# Patient Record
Sex: MALE | Race: WHITE | NOT HISPANIC OR LATINO | Employment: OTHER | URBAN - METROPOLITAN AREA
[De-identification: names, ages, dates, MRNs, and addresses within clinical notes are randomized per-mention and may not be internally consistent; named-entity substitution may affect disease eponyms.]

---

## 2017-01-19 ENCOUNTER — AMBULATORY - HEALTHEAST (OUTPATIENT)
Dept: CARDIOLOGY | Facility: CLINIC | Age: 82
End: 2017-01-19

## 2017-01-20 ENCOUNTER — COMMUNICATION - HEALTHEAST (OUTPATIENT)
Dept: CARDIOLOGY | Facility: CLINIC | Age: 82
End: 2017-01-20

## 2017-02-21 ENCOUNTER — AMBULATORY - HEALTHEAST (OUTPATIENT)
Dept: CARDIOLOGY | Facility: CLINIC | Age: 82
End: 2017-02-21

## 2017-02-21 ENCOUNTER — OFFICE VISIT - HEALTHEAST (OUTPATIENT)
Dept: CARDIOLOGY | Facility: CLINIC | Age: 82
End: 2017-02-21

## 2017-02-21 DIAGNOSIS — I50.31 ACUTE DIASTOLIC HEART FAILURE (H): ICD-10-CM

## 2017-02-21 ASSESSMENT — MIFFLIN-ST. JEOR: SCORE: 1830.14

## 2017-02-22 ENCOUNTER — COMMUNICATION - HEALTHEAST (OUTPATIENT)
Dept: CARDIOLOGY | Facility: CLINIC | Age: 82
End: 2017-02-22

## 2017-02-22 ENCOUNTER — AMBULATORY - HEALTHEAST (OUTPATIENT)
Dept: CARDIOLOGY | Facility: CLINIC | Age: 82
End: 2017-02-22

## 2017-02-22 DIAGNOSIS — Z95.0 PACEMAKER: ICD-10-CM

## 2017-03-08 ENCOUNTER — HOSPITAL ENCOUNTER (OUTPATIENT)
Dept: CARDIOLOGY | Facility: CLINIC | Age: 82
Discharge: HOME | End: 2017-03-08
Attending: INTERNAL MEDICINE

## 2017-03-08 ASSESSMENT — MIFFLIN-ST. JEOR: SCORE: 1830.14

## 2017-03-09 LAB
AORTIC ROOT: 3.5 CM
BSA FOR ECHO PROCEDURE: 2.42 M2
CV BLOOD PRESSURE: NORMAL MMHG
CV ECHO HEIGHT: 72 IN
CV ECHO WEIGHT: 254 LBS
DOP CALC LVOT AREA: 4.15 CM2
DOP CALC LVOT DIAMETER: 2.3 CM
DOP CALC LVOT PEAK VEL: 75.9 CM/S
DOP CALC LVOT STROKE VOLUME: 56.9 CM3
DOP CALCLVOT PEAK VEL VTI: 13.7 CM
ECHO EJECTION FRACTION ESTIMATED: 55 %
EJECTION FRACTION: 52 % (ref 55–75)
FRACTIONAL SHORTENING: 26.8 % (ref 28–44)
INTERVENTRICULAR SEPTUM IN END DIASTOLE: 0.94 CM (ref 0.6–1)
IVS/PW RATIO: 0.9
LA AREA 1: 23.3 CM2
LA AREA 2: 21.6 CM2
LEFT ATRIUM LENGTH: 5.66 CM
LEFT ATRIUM SIZE: 4.9 CM
LEFT ATRIUM TO AORTIC ROOT RATIO: 1.4 NO UNITS
LEFT ATRIUM VOLUME INDEX: 31.2 ML/M2
LEFT ATRIUM VOLUME: 75.6 CM3
LEFT VENTRICLE DIASTOLIC VOLUME INDEX: 38 CM3/M2 (ref 34–74)
LEFT VENTRICLE DIASTOLIC VOLUME: 92 CM3 (ref 62–150)
LEFT VENTRICLE MASS INDEX: 66.9 G/M2
LEFT VENTRICLE SYSTOLIC VOLUME INDEX: 18.2 CM3/M2 (ref 11–31)
LEFT VENTRICLE SYSTOLIC VOLUME: 44 CM3 (ref 21–61)
LEFT VENTRICULAR INTERNAL DIMENSION IN DIASTOLE: 4.71 CM (ref 4.2–5.8)
LEFT VENTRICULAR INTERNAL DIMENSION IN SYSTOLE: 3.45 CM (ref 2.5–4)
LEFT VENTRICULAR MASS: 161.8 G
LEFT VENTRICULAR OUTFLOW TRACT MEAN GRADIENT: 1 MMHG
LEFT VENTRICULAR OUTFLOW TRACT MEAN VELOCITY: 56.2 CM/S
LEFT VENTRICULAR OUTFLOW TRACT PEAK GRADIENT: 2 MMHG
LEFT VENTRICULAR POSTERIOR WALL IN END DIASTOLE: 1.03 CM (ref 0.6–1)
LV STROKE VOLUME INDEX: 23.5 ML/M2
MV AVERAGE E/E' RATIO: 6.6 CM/S
MV DECELERATION TIME: 204 MS
MV E'TISSUE VEL-LAT: 14.8 CM/S
MV E'TISSUE VEL-MED: 13.3 CM/S
MV LATERAL E/E' RATIO: 6.3
MV MEDIAL E/E' RATIO: 7
MV PEAK E VELOCITY: 92.8 CM/S
NUC REST DIASTOLIC VOLUME INDEX: 4064 LBS
NUC REST SYSTOLIC VOLUME INDEX: 72 IN
TRICUSPID REGURGITATION PEAK PRESSURE GRADIENT: 59 MMHG
TRICUSPID VALVE ANULAR PLANE SYSTOLIC EXCURSION: 1.6 CM
TRICUSPID VALVE PEAK REGURGITANT VELOCITY: 384 CM/S

## 2017-05-25 ENCOUNTER — AMBULATORY - HEALTHEAST (OUTPATIENT)
Dept: CARDIOLOGY | Facility: CLINIC | Age: 82
End: 2017-05-25

## 2017-05-31 ENCOUNTER — OFFICE VISIT - HEALTHEAST (OUTPATIENT)
Dept: CARDIOLOGY | Facility: CLINIC | Age: 82
End: 2017-05-31

## 2017-05-31 ENCOUNTER — AMBULATORY - HEALTHEAST (OUTPATIENT)
Dept: CARDIOLOGY | Facility: CLINIC | Age: 82
End: 2017-05-31

## 2017-05-31 DIAGNOSIS — Z95.0 PRESENCE OF PERMANENT CARDIAC PACEMAKER: ICD-10-CM

## 2017-05-31 DIAGNOSIS — Z95.0 PACEMAKER: ICD-10-CM

## 2017-05-31 DIAGNOSIS — I50.32 CHRONIC DIASTOLIC HEART FAILURE (H): ICD-10-CM

## 2017-05-31 DIAGNOSIS — I48.91 ATRIAL FIBRILLATION (H): ICD-10-CM

## 2017-05-31 LAB — HCC DEVICE COMMENTS: NORMAL

## 2017-05-31 ASSESSMENT — MIFFLIN-ST. JEOR: SCORE: 1830.14

## 2017-06-06 ENCOUNTER — HOSPITAL ENCOUNTER (OUTPATIENT)
Dept: CARDIOLOGY | Facility: CLINIC | Age: 82
Discharge: HOME | End: 2017-06-06
Attending: INTERNAL MEDICINE

## 2017-06-06 DIAGNOSIS — I48.91 ATRIAL FIBRILLATION (H): ICD-10-CM

## 2017-06-15 ENCOUNTER — COMMUNICATION - HEALTHEAST (OUTPATIENT)
Dept: CARDIOLOGY | Facility: CLINIC | Age: 82
End: 2017-06-15

## 2017-06-15 DIAGNOSIS — I48.0 PAROXYSMAL ATRIAL FIBRILLATION (H): ICD-10-CM

## 2017-07-06 ENCOUNTER — COMMUNICATION - HEALTHEAST (OUTPATIENT)
Dept: CARDIOLOGY | Facility: CLINIC | Age: 82
End: 2017-07-06

## 2017-07-06 DIAGNOSIS — R07.9 CHEST PAIN: ICD-10-CM

## 2017-08-30 ENCOUNTER — AMBULATORY - HEALTHEAST (OUTPATIENT)
Dept: CARDIOLOGY | Facility: CLINIC | Age: 82
End: 2017-08-30

## 2017-08-30 DIAGNOSIS — Z95.0 PACEMAKER: ICD-10-CM

## 2017-08-30 LAB — HCC DEVICE COMMENTS: NORMAL

## 2017-09-28 ENCOUNTER — COMMUNICATION - HEALTHEAST (OUTPATIENT)
Dept: CARDIOLOGY | Facility: CLINIC | Age: 82
End: 2017-09-28

## 2017-09-28 DIAGNOSIS — R07.9 CHEST PAIN: ICD-10-CM

## 2017-12-04 ENCOUNTER — RECORDS - HEALTHEAST (OUTPATIENT)
Dept: LAB | Facility: CLINIC | Age: 82
End: 2017-12-04

## 2017-12-04 LAB
CHOLEST SERPL-MCNC: 120 MG/DL
FASTING STATUS PATIENT QL REPORTED: NO
HDLC SERPL-MCNC: 39 MG/DL
LDLC SERPL CALC-MCNC: 61 MG/DL
TRIGL SERPL-MCNC: 101 MG/DL

## 2017-12-06 ENCOUNTER — AMBULATORY - HEALTHEAST (OUTPATIENT)
Dept: CARDIOLOGY | Facility: CLINIC | Age: 82
End: 2017-12-06

## 2017-12-06 DIAGNOSIS — Z95.0 PACEMAKER: ICD-10-CM

## 2017-12-06 LAB — HCC DEVICE COMMENTS: NORMAL

## 2017-12-14 ENCOUNTER — COMMUNICATION - HEALTHEAST (OUTPATIENT)
Dept: CARDIOLOGY | Facility: CLINIC | Age: 82
End: 2017-12-14

## 2017-12-14 DIAGNOSIS — I48.0 PAROXYSMAL ATRIAL FIBRILLATION (H): ICD-10-CM

## 2018-01-24 ENCOUNTER — COMMUNICATION - HEALTHEAST (OUTPATIENT)
Dept: ADMINISTRATIVE | Facility: CLINIC | Age: 83
End: 2018-01-24

## 2018-03-08 ENCOUNTER — COMMUNICATION - HEALTHEAST (OUTPATIENT)
Dept: CARDIOLOGY | Facility: CLINIC | Age: 83
End: 2018-03-08

## 2018-03-08 DIAGNOSIS — I48.0 PAROXYSMAL ATRIAL FIBRILLATION (H): ICD-10-CM

## 2018-03-14 ENCOUNTER — AMBULATORY - HEALTHEAST (OUTPATIENT)
Dept: CARDIOLOGY | Facility: CLINIC | Age: 83
End: 2018-03-14

## 2018-03-14 DIAGNOSIS — Z95.0 PACEMAKER: ICD-10-CM

## 2018-03-14 LAB — HCC DEVICE COMMENTS: NORMAL

## 2018-04-05 ENCOUNTER — HOME CARE/HOSPICE - HEALTHEAST (OUTPATIENT)
Dept: HOME HEALTH SERVICES | Facility: HOME HEALTH | Age: 83
End: 2018-04-05

## 2018-04-08 ENCOUNTER — HOME CARE/HOSPICE - HEALTHEAST (OUTPATIENT)
Dept: HOME HEALTH SERVICES | Facility: HOME HEALTH | Age: 83
End: 2018-04-08

## 2018-04-11 ENCOUNTER — HOME CARE/HOSPICE - HEALTHEAST (OUTPATIENT)
Dept: HOME HEALTH SERVICES | Facility: HOME HEALTH | Age: 83
End: 2018-04-11

## 2018-04-13 ENCOUNTER — HOME CARE/HOSPICE - HEALTHEAST (OUTPATIENT)
Dept: HOME HEALTH SERVICES | Facility: HOME HEALTH | Age: 83
End: 2018-04-13

## 2018-04-18 ENCOUNTER — OFFICE VISIT - HEALTHEAST (OUTPATIENT)
Dept: CARDIOLOGY | Facility: CLINIC | Age: 83
End: 2018-04-18

## 2018-04-18 DIAGNOSIS — I50.9 CHF (CONGESTIVE HEART FAILURE) (H): ICD-10-CM

## 2018-04-18 DIAGNOSIS — I48.91 A-FIB (H): ICD-10-CM

## 2018-04-18 LAB
ANION GAP SERPL CALCULATED.3IONS-SCNC: 11 MMOL/L (ref 5–18)
ATRIAL RATE - MUSE: 78 BPM
BNP SERPL-MCNC: 579 PG/ML (ref 0–93)
BUN SERPL-MCNC: 19 MG/DL (ref 8–28)
CALCIUM SERPL-MCNC: 9.1 MG/DL (ref 8.5–10.5)
CHLORIDE BLD-SCNC: 94 MMOL/L (ref 98–107)
CO2 SERPL-SCNC: 34 MMOL/L (ref 22–31)
CREAT SERPL-MCNC: 0.86 MG/DL (ref 0.7–1.3)
DIASTOLIC BLOOD PRESSURE - MUSE: NORMAL MMHG
GFR SERPL CREATININE-BSD FRML MDRD: >60 ML/MIN/1.73M2
GLUCOSE BLD-MCNC: 159 MG/DL (ref 70–125)
INTERPRETATION ECG - MUSE: NORMAL
MAGNESIUM SERPL-MCNC: 1.6 MG/DL (ref 1.8–2.6)
P AXIS - MUSE: NORMAL DEGREES
POTASSIUM BLD-SCNC: 3.9 MMOL/L (ref 3.5–5)
PR INTERVAL - MUSE: NORMAL MS
QRS DURATION - MUSE: 88 MS
QT - MUSE: 390 MS
QTC - MUSE: 429 MS
R AXIS - MUSE: 99 DEGREES
SODIUM SERPL-SCNC: 139 MMOL/L (ref 136–145)
SYSTOLIC BLOOD PRESSURE - MUSE: NORMAL MMHG
T AXIS - MUSE: -40 DEGREES
VENTRICULAR RATE- MUSE: 73 BPM

## 2018-04-18 ASSESSMENT — MIFFLIN-ST. JEOR: SCORE: 1823.34

## 2018-04-19 ENCOUNTER — HOME CARE/HOSPICE - HEALTHEAST (OUTPATIENT)
Dept: HOME HEALTH SERVICES | Facility: HOME HEALTH | Age: 83
End: 2018-04-19

## 2018-04-20 ENCOUNTER — HOME CARE/HOSPICE - HEALTHEAST (OUTPATIENT)
Dept: HOME HEALTH SERVICES | Facility: HOME HEALTH | Age: 83
End: 2018-04-20

## 2018-04-24 ENCOUNTER — HOME CARE/HOSPICE - HEALTHEAST (OUTPATIENT)
Dept: HOME HEALTH SERVICES | Facility: HOME HEALTH | Age: 83
End: 2018-04-24

## 2018-04-24 LAB — POC INR - HE - HISTORICAL: 2.3 (ref 0.9–1.1)

## 2018-04-25 ENCOUNTER — HOSPITAL ENCOUNTER (OUTPATIENT)
Dept: CARDIOLOGY | Facility: CLINIC | Age: 83
Discharge: HOME | End: 2018-04-25
Attending: INTERNAL MEDICINE

## 2018-04-25 DIAGNOSIS — I50.9 CHF (CONGESTIVE HEART FAILURE) (H): ICD-10-CM

## 2018-04-25 LAB
AORTIC ROOT: 3.7 CM
AORTIC VALVE MEAN VELOCITY: 86.9 CM/S
ASCENDING AORTA: 3.6 CM
AV CUSP SEPERATION: 2.1 CM
AV CUSP SEPERATION: 2.1 CM
AV DIMENSIONLESS INDEX VTI: 0.7
AV MEAN GRADIENT: 3 MMHG
AV PEAK GRADIENT: 5.8 MMHG
AV VALVE AREA: 2.6 CM2
AV VELOCITY RATIO: 0.6
BSA FOR ECHO PROCEDURE: 2.41 M2
CV ECHO HEIGHT: 73 IN
CV ECHO WEIGHT: 249 LBS
DOP CALC AO PEAK VEL: 120 CM/S
DOP CALC AO VTI: 17.6 CM
DOP CALC LVOT AREA: 3.8 CM2
DOP CALC LVOT DIAMETER: 2.2 CM
DOP CALC LVOT PEAK VEL: 70.2 CM/S
DOP CALC LVOT STROKE VOLUME: 46.4 CM3
DOP CALC MV VTI: 11.6 CM
DOP CALCLVOT PEAK VEL VTI: 12.2 CM
EJECTION FRACTION: 56
EJECTION FRACTION: 56 % (ref 55–75)
FRACTIONAL SHORTENING: 26.2 % (ref 28–44)
INTERVENTRICULAR SEPTUM IN END DIASTOLE: 1.09 CM (ref 0.6–1)
IVS/PW RATIO: 1
LA AREA 1: 20.6 CM2
LA AREA 2: 19.3 CM2
LEFT ATRIUM LENGTH: 5.5 CM
LEFT ATRIUM SIZE: 4.9 CM
LEFT ATRIUM VOLUME INDEX: 25.5 ML/M2
LEFT ATRIUM VOLUME: 61.4 ML
LEFT VENTRICLE CARDIAC INDEX: 1.7 L/MIN/M2
LEFT VENTRICLE CARDIAC OUTPUT: 4.2 L/MIN
LEFT VENTRICLE DIASTOLIC VOLUME INDEX: 22.1 CM3/M2 (ref 34–74)
LEFT VENTRICLE DIASTOLIC VOLUME: 53.3 CM3 (ref 62–150)
LEFT VENTRICLE HEART RATE: 90 BPM
LEFT VENTRICLE MASS INDEX: 79.7 G/M2
LEFT VENTRICLE SYSTOLIC VOLUME INDEX: 9.8 CM3/M2 (ref 11–31)
LEFT VENTRICLE SYSTOLIC VOLUME: 23.5 CM3 (ref 21–61)
LEFT VENTRICULAR INTERNAL DIMENSION IN DIASTOLE: 4.81 CM (ref 4.2–5.8)
LEFT VENTRICULAR INTERNAL DIMENSION IN SYSTOLE: 3.55 CM (ref 2.5–4)
LEFT VENTRICULAR MASS: 192.2 G
LEFT VENTRICULAR OUTFLOW TRACT MEAN GRADIENT: 1 MMHG
LEFT VENTRICULAR OUTFLOW TRACT MEAN VELOCITY: 49 CM/S
LEFT VENTRICULAR OUTFLOW TRACT PEAK GRADIENT: 2 MMHG
LEFT VENTRICULAR POSTERIOR WALL IN END DIASTOLE: 1.09 CM (ref 0.6–1)
LV STROKE VOLUME INDEX: 19.2 ML/M2
MITRAL VALVE DECELERATION SLOPE: 5980 MM/S2
MITRAL VALVE MEAN INFLOW VELOCITY: 39.9 CM/S
MITRAL VALVE PEAK VELOCITY: 93.7 CM/S
MITRAL VALVE PRESSURE HALF-TIME: 50 MS
MV AREA VTI: 4 CM2
MV AVERAGE E/E' RATIO: 7.9 CM/S
MV DECELERATION TIME: 173 MS
MV E'TISSUE VEL-LAT: 12.1 CM/S
MV E'TISSUE VEL-MED: 6.58 CM/S
MV LATERAL E/E' RATIO: 6.1
MV MEAN GRADIENT: 1 MMHG
MV MEDIAL E/E' RATIO: 11.2
MV PEAK E VELOCITY: 73.7 CM/S
MV PEAK GRADIENT: 3.5 MMHG
MV VALVE AREA BY CONTINUITY EQUATION: 4 CM2
MV VALVE AREA PRESSURE 1/2 METHOD: 4.4 CM2
NUC REST DIASTOLIC VOLUME INDEX: 3984 LBS
NUC REST SYSTOLIC VOLUME INDEX: 73 IN
RAA: 35.1 CM2
RIGHT VENTRICULAR INTERNAL DIMENSION IN DYSTOLE: 3.43 CM
TRICUSPID REGURGITATION PEAK PRESSURE GRADIENT: 71.2 MMHG
TRICUSPID VALVE ANULAR PLANE SYSTOLIC EXCURSION: 1.1 CM
TRICUSPID VALVE PEAK REGURGITANT VELOCITY: 422 CM/S

## 2018-04-25 ASSESSMENT — MIFFLIN-ST. JEOR: SCORE: 1823.34

## 2018-04-26 ENCOUNTER — HOME CARE/HOSPICE - HEALTHEAST (OUTPATIENT)
Dept: HOME HEALTH SERVICES | Facility: HOME HEALTH | Age: 83
End: 2018-04-26

## 2018-04-27 ENCOUNTER — AMBULATORY - HEALTHEAST (OUTPATIENT)
Dept: CARDIOLOGY | Facility: CLINIC | Age: 83
End: 2018-04-27

## 2018-04-27 DIAGNOSIS — I27.20 PULMONARY HYPERTENSION, MODERATE TO SEVERE (H): ICD-10-CM

## 2018-04-27 DIAGNOSIS — I10 ESSENTIAL HYPERTENSION: ICD-10-CM

## 2018-04-27 DIAGNOSIS — Z95.0 PRESENCE OF PERMANENT CARDIAC PACEMAKER: ICD-10-CM

## 2018-04-27 DIAGNOSIS — I48.19 PERSISTENT ATRIAL FIBRILLATION (H): ICD-10-CM

## 2018-04-27 ASSESSMENT — MIFFLIN-ST. JEOR: SCORE: 1792.94

## 2018-04-30 ENCOUNTER — COMMUNICATION - HEALTHEAST (OUTPATIENT)
Dept: CARDIOLOGY | Facility: CLINIC | Age: 83
End: 2018-04-30

## 2018-04-30 DIAGNOSIS — I48.19 PERSISTENT ATRIAL FIBRILLATION (H): ICD-10-CM

## 2018-04-30 LAB
HCC DEVICE COMMENTS: NORMAL
HCC DEVICE IMPLANTING PROVIDER: NORMAL
HCC DEVICE MANUFACTURE: NORMAL
HCC DEVICE MODEL: NORMAL
HCC DEVICE SERIAL NUMBER: NORMAL
HCC DEVICE TYPE: NORMAL

## 2018-05-01 ENCOUNTER — HOME CARE/HOSPICE - HEALTHEAST (OUTPATIENT)
Dept: HOME HEALTH SERVICES | Facility: HOME HEALTH | Age: 83
End: 2018-05-01

## 2018-05-02 ENCOUNTER — COMMUNICATION - HEALTHEAST (OUTPATIENT)
Dept: CARDIOLOGY | Facility: CLINIC | Age: 83
End: 2018-05-02

## 2018-05-02 ENCOUNTER — AMBULATORY - HEALTHEAST (OUTPATIENT)
Dept: CARDIOLOGY | Facility: CLINIC | Age: 83
End: 2018-05-02

## 2018-05-02 DIAGNOSIS — I48.19 PERSISTENT ATRIAL FIBRILLATION (H): ICD-10-CM

## 2018-05-02 LAB
ATRIAL RATE - MUSE: 79 BPM
DIASTOLIC BLOOD PRESSURE - MUSE: NORMAL MMHG
INTERPRETATION ECG - MUSE: NORMAL
P AXIS - MUSE: 35 DEGREES
PR INTERVAL - MUSE: 176 MS
QRS DURATION - MUSE: 90 MS
QT - MUSE: 398 MS
QTC - MUSE: 456 MS
R AXIS - MUSE: 89 DEGREES
SYSTOLIC BLOOD PRESSURE - MUSE: NORMAL MMHG
T AXIS - MUSE: -16 DEGREES
VENTRICULAR RATE- MUSE: 79 BPM

## 2018-05-02 ASSESSMENT — MIFFLIN-ST. JEOR: SCORE: 1775.7

## 2018-05-04 ENCOUNTER — COMMUNICATION - HEALTHEAST (OUTPATIENT)
Dept: CARDIOLOGY | Facility: CLINIC | Age: 83
End: 2018-05-04

## 2018-05-07 ENCOUNTER — COMMUNICATION - HEALTHEAST (OUTPATIENT)
Dept: CARDIOLOGY | Facility: CLINIC | Age: 83
End: 2018-05-07

## 2018-05-09 ENCOUNTER — OFFICE VISIT - HEALTHEAST (OUTPATIENT)
Dept: CARDIOLOGY | Facility: CLINIC | Age: 83
End: 2018-05-09

## 2018-05-09 DIAGNOSIS — I48.19 PERSISTENT ATRIAL FIBRILLATION (H): ICD-10-CM

## 2018-05-09 DIAGNOSIS — I50.32 CHRONIC DIASTOLIC CHF (CONGESTIVE HEART FAILURE), NYHA CLASS 3 (H): ICD-10-CM

## 2018-05-09 DIAGNOSIS — I27.20 PULMONARY HYPERTENSION, MODERATE TO SEVERE (H): ICD-10-CM

## 2018-05-09 DIAGNOSIS — J96.11 CHRONIC RESPIRATORY FAILURE WITH HYPOXIA (H): ICD-10-CM

## 2018-05-09 DIAGNOSIS — I10 ESSENTIAL HYPERTENSION: ICD-10-CM

## 2018-05-09 ASSESSMENT — MIFFLIN-ST. JEOR: SCORE: 1789.31

## 2018-05-17 ENCOUNTER — COMMUNICATION - HEALTHEAST (OUTPATIENT)
Dept: CARDIOLOGY | Facility: CLINIC | Age: 83
End: 2018-05-17

## 2018-05-18 ENCOUNTER — COMMUNICATION - HEALTHEAST (OUTPATIENT)
Dept: CARDIOLOGY | Facility: CLINIC | Age: 83
End: 2018-05-18

## 2018-05-21 ENCOUNTER — RECORDS - HEALTHEAST (OUTPATIENT)
Dept: LAB | Facility: CLINIC | Age: 83
End: 2018-05-21

## 2018-05-21 ENCOUNTER — COMMUNICATION - HEALTHEAST (OUTPATIENT)
Dept: CARDIOLOGY | Facility: CLINIC | Age: 83
End: 2018-05-21

## 2018-05-21 ENCOUNTER — AMBULATORY - HEALTHEAST (OUTPATIENT)
Dept: CARDIOLOGY | Facility: CLINIC | Age: 83
End: 2018-05-21

## 2018-05-21 DIAGNOSIS — I50.32 CHRONIC DIASTOLIC HEART FAILURE, NYHA CLASS 3 (H): ICD-10-CM

## 2018-05-21 LAB
ANION GAP SERPL CALCULATED.3IONS-SCNC: 10 MMOL/L (ref 5–18)
BUN SERPL-MCNC: 15 MG/DL (ref 8–28)
CALCIUM SERPL-MCNC: 9.1 MG/DL (ref 8.5–10.5)
CHLORIDE BLD-SCNC: 97 MMOL/L (ref 98–107)
CO2 SERPL-SCNC: 33 MMOL/L (ref 22–31)
CREAT SERPL-MCNC: 0.9 MG/DL (ref 0.7–1.3)
GFR SERPL CREATININE-BSD FRML MDRD: >60 ML/MIN/1.73M2
GLUCOSE BLD-MCNC: 130 MG/DL (ref 70–125)
POTASSIUM BLD-SCNC: 3.9 MMOL/L (ref 3.5–5)
SODIUM SERPL-SCNC: 140 MMOL/L (ref 136–145)

## 2018-05-24 ENCOUNTER — COMMUNICATION - HEALTHEAST (OUTPATIENT)
Dept: INTENSIVE CARE | Facility: CLINIC | Age: 83
End: 2018-05-24

## 2018-05-24 DIAGNOSIS — I50.31 DIASTOLIC CHF, ACUTE (H): ICD-10-CM

## 2018-05-31 ENCOUNTER — AMBULATORY - HEALTHEAST (OUTPATIENT)
Dept: CARDIOLOGY | Facility: CLINIC | Age: 83
End: 2018-05-31

## 2018-06-15 ENCOUNTER — AMBULATORY - HEALTHEAST (OUTPATIENT)
Dept: CARDIOLOGY | Facility: CLINIC | Age: 83
End: 2018-06-15

## 2018-06-15 ENCOUNTER — RECORDS - HEALTHEAST (OUTPATIENT)
Dept: LAB | Facility: CLINIC | Age: 83
End: 2018-06-15

## 2018-06-15 ENCOUNTER — RECORDS - HEALTHEAST (OUTPATIENT)
Dept: ADMINISTRATIVE | Facility: OTHER | Age: 83
End: 2018-06-15

## 2018-06-15 LAB
ALBUMIN SERPL-MCNC: 3.6 G/DL (ref 3.5–5)
ALP SERPL-CCNC: 52 U/L (ref 45–120)
ALT SERPL W P-5'-P-CCNC: 11 U/L (ref 0–45)
ANION GAP SERPL CALCULATED.3IONS-SCNC: 10 MMOL/L (ref 5–18)
AST SERPL W P-5'-P-CCNC: 14 U/L (ref 0–40)
BILIRUB SERPL-MCNC: 0.7 MG/DL (ref 0–1)
BUN SERPL-MCNC: 18 MG/DL (ref 8–28)
CALCIUM SERPL-MCNC: 9.3 MG/DL (ref 8.5–10.5)
CHLORIDE BLD-SCNC: 99 MMOL/L (ref 98–107)
CO2 SERPL-SCNC: 33 MMOL/L (ref 22–31)
CREAT SERPL-MCNC: 0.99 MG/DL (ref 0.7–1.3)
GFR SERPL CREATININE-BSD FRML MDRD: >60 ML/MIN/1.73M2
GLUCOSE BLD-MCNC: 113 MG/DL (ref 70–125)
POTASSIUM BLD-SCNC: 4 MMOL/L (ref 3.5–5)
PROT SERPL-MCNC: 6.7 G/DL (ref 6–8)
SODIUM SERPL-SCNC: 142 MMOL/L (ref 136–145)

## 2018-06-21 ENCOUNTER — OFFICE VISIT - HEALTHEAST (OUTPATIENT)
Dept: CARDIOLOGY | Facility: CLINIC | Age: 83
End: 2018-06-21

## 2018-06-21 ENCOUNTER — AMBULATORY - HEALTHEAST (OUTPATIENT)
Dept: CARDIOLOGY | Facility: CLINIC | Age: 83
End: 2018-06-21

## 2018-06-21 DIAGNOSIS — I27.23 PULMONARY HYPERTENSION DUE TO LUNG DISEASES AND HYPOXIA (H): ICD-10-CM

## 2018-06-21 DIAGNOSIS — J96.11 CHRONIC RESPIRATORY FAILURE WITH HYPOXIA (H): ICD-10-CM

## 2018-06-21 DIAGNOSIS — Z95.0 PRESENCE OF PERMANENT CARDIAC PACEMAKER: ICD-10-CM

## 2018-06-21 LAB
HCC DEVICE COMMENTS: NORMAL
HCC DEVICE IMPLANTING PROVIDER: NORMAL
HCC DEVICE MANUFACTURE: NORMAL
HCC DEVICE MODEL: NORMAL
HCC DEVICE SERIAL NUMBER: NORMAL
HCC DEVICE TYPE: NORMAL
TSH SERPL DL<=0.005 MIU/L-ACNC: 2.87 UIU/ML (ref 0.3–5)

## 2018-06-21 ASSESSMENT — MIFFLIN-ST. JEOR: SCORE: 1743.95

## 2018-06-22 ENCOUNTER — COMMUNICATION - HEALTHEAST (OUTPATIENT)
Dept: CARDIOLOGY | Facility: CLINIC | Age: 83
End: 2018-06-22

## 2018-06-22 ENCOUNTER — AMBULATORY - HEALTHEAST (OUTPATIENT)
Dept: PULMONOLOGY | Facility: OTHER | Age: 83
End: 2018-06-22

## 2018-06-22 DIAGNOSIS — I27.20 PULMONARY HTN (H): ICD-10-CM

## 2018-06-25 LAB — ANA SER QL: 0.8 U

## 2018-06-28 ENCOUNTER — COMMUNICATION - HEALTHEAST (OUTPATIENT)
Dept: CARDIOLOGY | Facility: CLINIC | Age: 83
End: 2018-06-28

## 2018-06-28 DIAGNOSIS — R07.9 CHEST PAIN: ICD-10-CM

## 2018-07-12 ENCOUNTER — AMBULATORY - HEALTHEAST (OUTPATIENT)
Dept: CARDIOLOGY | Facility: CLINIC | Age: 83
End: 2018-07-12

## 2018-07-12 ENCOUNTER — COMMUNICATION - HEALTHEAST (OUTPATIENT)
Dept: CARDIOLOGY | Facility: CLINIC | Age: 83
End: 2018-07-12

## 2018-07-12 DIAGNOSIS — Z95.0 PRESENCE OF PERMANENT CARDIAC PACEMAKER: ICD-10-CM

## 2018-07-23 ENCOUNTER — AMBULATORY - HEALTHEAST (OUTPATIENT)
Dept: CARDIOLOGY | Facility: CLINIC | Age: 83
End: 2018-07-23

## 2018-07-23 DIAGNOSIS — Z95.0 PRESENCE OF PERMANENT CARDIAC PACEMAKER: ICD-10-CM

## 2018-07-23 DIAGNOSIS — Z95.0 PACEMAKER: ICD-10-CM

## 2018-07-23 DIAGNOSIS — G45.9 TIA (TRANSIENT ISCHEMIC ATTACK): ICD-10-CM

## 2018-07-23 DIAGNOSIS — I48.19 PERSISTENT ATRIAL FIBRILLATION (H): ICD-10-CM

## 2018-07-23 DIAGNOSIS — I50.32 CHRONIC DIASTOLIC CONGESTIVE HEART FAILURE (H): ICD-10-CM

## 2018-07-23 ASSESSMENT — MIFFLIN-ST. JEOR: SCORE: 1712.2

## 2018-07-30 ENCOUNTER — HOSPITAL ENCOUNTER (OUTPATIENT)
Dept: RESPIRATORY THERAPY | Facility: CLINIC | Age: 83
Discharge: HOME | End: 2018-07-30
Attending: INTERNAL MEDICINE

## 2018-07-30 DIAGNOSIS — I27.20 PULMONARY HTN (H): ICD-10-CM

## 2018-08-10 ENCOUNTER — TRANSFERRED RECORDS (OUTPATIENT)
Dept: HEALTH INFORMATION MANAGEMENT | Facility: CLINIC | Age: 83
End: 2018-08-10

## 2018-08-11 ENCOUNTER — HOME CARE/HOSPICE - HEALTHEAST (OUTPATIENT)
Dept: HOME HEALTH SERVICES | Facility: HOME HEALTH | Age: 83
End: 2018-08-11

## 2018-08-27 ENCOUNTER — RECORDS - HEALTHEAST (OUTPATIENT)
Dept: ADMINISTRATIVE | Facility: OTHER | Age: 83
End: 2018-08-27

## 2018-08-30 ENCOUNTER — OFFICE VISIT - HEALTHEAST (OUTPATIENT)
Dept: CARDIOLOGY | Facility: CLINIC | Age: 83
End: 2018-08-30

## 2018-08-30 DIAGNOSIS — I27.23 PULMONARY HYPERTENSION DUE TO LUNG DISEASES AND HYPOXIA (H): ICD-10-CM

## 2018-10-04 ENCOUNTER — HOSPITAL ENCOUNTER (OUTPATIENT)
Facility: CLINIC | Age: 83
Setting detail: OBSERVATION
Discharge: HOME | End: 2018-10-05
Attending: HOSPITALIST | Admitting: INTERNAL MEDICINE
Payer: MEDICARE

## 2018-10-04 ENCOUNTER — TRANSFERRED RECORDS (OUTPATIENT)
Dept: HEALTH INFORMATION MANAGEMENT | Facility: CLINIC | Age: 83
End: 2018-10-04

## 2018-10-04 PROBLEM — E78.5 DYSLIPIDEMIA, GOAL LDL BELOW 70: Status: ACTIVE | Noted: 2018-10-04

## 2018-10-04 PROBLEM — E11.9 DM2 (DIABETES MELLITUS, TYPE 2) (H): Status: ACTIVE | Noted: 2018-10-04

## 2018-10-04 PROBLEM — G45.9 TIA (TRANSIENT ISCHEMIC ATTACK): Status: ACTIVE | Noted: 2018-10-04

## 2018-10-04 LAB
ALBUMIN SERPL-MCNC: 3.5 G/DL (ref 3.4–5)
ALP SERPL-CCNC: 49 U/L (ref 40–150)
ALT SERPL W P-5'-P-CCNC: 13 U/L (ref 0–70)
AST SERPL W P-5'-P-CCNC: 18 U/L (ref 0–45)
BILIRUB DIRECT SERPL-MCNC: 0.2 MG/DL (ref 0–0.2)
BILIRUB SERPL-MCNC: 0.6 MG/DL (ref 0.2–1.3)
CHOLEST SERPL-MCNC: 137 MG/DL
HBA1C MFR BLD: 6.6 % (ref 0–5.6)
HDLC SERPL-MCNC: 45 MG/DL
LDLC SERPL CALC-MCNC: 67 MG/DL
NONHDLC SERPL-MCNC: 92 MG/DL
PROT SERPL-MCNC: 6.7 G/DL (ref 6.8–8.8)
TRIGL SERPL-MCNC: 125 MG/DL
TROPONIN I SERPL-MCNC: <0.015 UG/L (ref 0–0.04)

## 2018-10-04 PROCEDURE — 99220 ZZC INITIAL OBSERVATION CARE,LEVL III: CPT | Performed by: INTERNAL MEDICINE

## 2018-10-04 PROCEDURE — 80061 LIPID PANEL: CPT | Performed by: INTERNAL MEDICINE

## 2018-10-04 PROCEDURE — A9270 NON-COVERED ITEM OR SERVICE: HCPCS | Mod: GY | Performed by: INTERNAL MEDICINE

## 2018-10-04 PROCEDURE — 25000132 ZZH RX MED GY IP 250 OP 250 PS 637: Performed by: INTERNAL MEDICINE

## 2018-10-04 PROCEDURE — 83036 HEMOGLOBIN GLYCOSYLATED A1C: CPT | Performed by: INTERNAL MEDICINE

## 2018-10-04 PROCEDURE — G0378 HOSPITAL OBSERVATION PER HR: HCPCS

## 2018-10-04 PROCEDURE — 80076 HEPATIC FUNCTION PANEL: CPT | Performed by: INTERNAL MEDICINE

## 2018-10-04 PROCEDURE — 36415 COLL VENOUS BLD VENIPUNCTURE: CPT | Performed by: INTERNAL MEDICINE

## 2018-10-04 PROCEDURE — 84484 ASSAY OF TROPONIN QUANT: CPT | Performed by: INTERNAL MEDICINE

## 2018-10-04 RX ORDER — SOTALOL HYDROCHLORIDE 80 MG/1
80 TABLET ORAL 2 TIMES DAILY
COMMUNITY

## 2018-10-04 RX ORDER — NICOTINE POLACRILEX 4 MG
15-30 LOZENGE BUCCAL
Status: DISCONTINUED | OUTPATIENT
Start: 2018-10-04 | End: 2018-10-05 | Stop reason: HOSPADM

## 2018-10-04 RX ORDER — DEXTROSE MONOHYDRATE 25 G/50ML
25-50 INJECTION, SOLUTION INTRAVENOUS
Status: DISCONTINUED | OUTPATIENT
Start: 2018-10-04 | End: 2018-10-05 | Stop reason: HOSPADM

## 2018-10-04 RX ORDER — VITS A,C,E/LUTEIN/MINERALS 300MCG-200
1 TABLET ORAL 2 TIMES DAILY
COMMUNITY

## 2018-10-04 RX ORDER — TRAVOPROST OPHTHALMIC SOLUTION 0.04 MG/ML
1 SOLUTION OPHTHALMIC AT BEDTIME
Status: DISCONTINUED | OUTPATIENT
Start: 2018-10-04 | End: 2018-10-05 | Stop reason: HOSPADM

## 2018-10-04 RX ORDER — ATORVASTATIN CALCIUM 20 MG/1
20 TABLET, FILM COATED ORAL WEEKLY
COMMUNITY

## 2018-10-04 RX ORDER — FUROSEMIDE 40 MG
80 TABLET ORAL EVERY OTHER DAY
Status: DISCONTINUED | OUTPATIENT
Start: 2018-10-06 | End: 2018-10-05 | Stop reason: HOSPADM

## 2018-10-04 RX ORDER — ATORVASTATIN CALCIUM 20 MG/1
20 TABLET, FILM COATED ORAL WEEKLY
Status: DISCONTINUED | OUTPATIENT
Start: 2018-10-05 | End: 2018-10-05 | Stop reason: HOSPADM

## 2018-10-04 RX ORDER — TRAVOPROST OPHTHALMIC SOLUTION 0.04 MG/ML
1 SOLUTION OPHTHALMIC AT BEDTIME
COMMUNITY

## 2018-10-04 RX ORDER — NALOXONE HYDROCHLORIDE 0.4 MG/ML
.1-.4 INJECTION, SOLUTION INTRAMUSCULAR; INTRAVENOUS; SUBCUTANEOUS
Status: DISCONTINUED | OUTPATIENT
Start: 2018-10-04 | End: 2018-10-05 | Stop reason: HOSPADM

## 2018-10-04 RX ORDER — ONDANSETRON 4 MG/1
4 TABLET, ORALLY DISINTEGRATING ORAL EVERY 6 HOURS PRN
Status: DISCONTINUED | OUTPATIENT
Start: 2018-10-04 | End: 2018-10-05 | Stop reason: HOSPADM

## 2018-10-04 RX ORDER — LOPERAMIDE HYDROCHLORIDE 1 MG/5ML
3 SOLUTION ORAL
COMMUNITY

## 2018-10-04 RX ORDER — BRIMONIDINE TARTRATE AND TIMOLOL MALEATE 2; 5 MG/ML; MG/ML
1 SOLUTION OPHTHALMIC 2 TIMES DAILY
COMMUNITY

## 2018-10-04 RX ORDER — CHLORAL HYDRATE 500 MG
1 CAPSULE ORAL DAILY
COMMUNITY

## 2018-10-04 RX ORDER — AMOXICILLIN 250 MG
1 CAPSULE ORAL 2 TIMES DAILY PRN
Status: DISCONTINUED | OUTPATIENT
Start: 2018-10-04 | End: 2018-10-05 | Stop reason: HOSPADM

## 2018-10-04 RX ORDER — SOTALOL HYDROCHLORIDE 80 MG/1
80 TABLET ORAL 2 TIMES DAILY
Status: DISCONTINUED | OUTPATIENT
Start: 2018-10-04 | End: 2018-10-05 | Stop reason: HOSPADM

## 2018-10-04 RX ORDER — ACETAMINOPHEN 650 MG/1
650 SUPPOSITORY RECTAL EVERY 4 HOURS PRN
Status: DISCONTINUED | OUTPATIENT
Start: 2018-10-04 | End: 2018-10-05 | Stop reason: HOSPADM

## 2018-10-04 RX ORDER — FUROSEMIDE 40 MG
40 TABLET ORAL EVERY OTHER DAY
COMMUNITY

## 2018-10-04 RX ORDER — FUROSEMIDE 40 MG
40 TABLET ORAL EVERY OTHER DAY
Status: DISCONTINUED | OUTPATIENT
Start: 2018-10-05 | End: 2018-10-05 | Stop reason: HOSPADM

## 2018-10-04 RX ORDER — FUROSEMIDE 40 MG
80 TABLET ORAL EVERY OTHER DAY
COMMUNITY

## 2018-10-04 RX ORDER — ACETAMINOPHEN 325 MG/1
650 TABLET ORAL EVERY 4 HOURS PRN
Status: DISCONTINUED | OUTPATIENT
Start: 2018-10-04 | End: 2018-10-05 | Stop reason: HOSPADM

## 2018-10-04 RX ORDER — NITROGLYCERIN 0.4 MG/1
0.4 TABLET SUBLINGUAL EVERY 5 MIN PRN
COMMUNITY

## 2018-10-04 RX ORDER — BRIMONIDINE TARTRATE AND TIMOLOL MALEATE 2; 5 MG/ML; MG/ML
1 SOLUTION OPHTHALMIC 2 TIMES DAILY
Status: DISCONTINUED | OUTPATIENT
Start: 2018-10-04 | End: 2018-10-05 | Stop reason: HOSPADM

## 2018-10-04 RX ORDER — ONDANSETRON 2 MG/ML
4 INJECTION INTRAMUSCULAR; INTRAVENOUS EVERY 6 HOURS PRN
Status: DISCONTINUED | OUTPATIENT
Start: 2018-10-04 | End: 2018-10-05 | Stop reason: HOSPADM

## 2018-10-04 RX ORDER — AMOXICILLIN 250 MG
2 CAPSULE ORAL 2 TIMES DAILY PRN
Status: DISCONTINUED | OUTPATIENT
Start: 2018-10-04 | End: 2018-10-05 | Stop reason: HOSPADM

## 2018-10-04 RX ADMIN — ACETAMINOPHEN 650 MG: 325 TABLET, FILM COATED ORAL at 20:12

## 2018-10-04 RX ADMIN — BRIMONIDINE TARTRATE AND TIMOLOL MALEATE 1 DROP: 2; 5 SOLUTION OPHTHALMIC at 20:14

## 2018-10-04 RX ADMIN — SOTALOL HYDROCHLORIDE 80 MG: 80 TABLET ORAL at 21:22

## 2018-10-04 RX ADMIN — APIXABAN 5 MG: 5 TABLET, FILM COATED ORAL at 20:12

## 2018-10-04 RX ADMIN — METFORMIN HYDROCHLORIDE 500 MG: 500 TABLET ORAL at 21:18

## 2018-10-04 RX ADMIN — TRAVOPROST OPHTHALMIC SOLUTION 1 DROP: 0.04 SOLUTION OPHTHALMIC at 20:16

## 2018-10-04 NOTE — IP AVS SNAPSHOT
MRN:[id number]                      After Visit Summary   10/4/2018    Hola Garcia    MRN: [id number]           Thank you!     Thank you for choosing [facility] for your care. Our goal is always to provide you with excellent care. Hearing back from our patients is one way we can continue to improve our services. Please take a few minutes to complete the written survey that you may receive in the mail after you visit with us. Thank you!        Patient Information     Date Of Birth          6/12/1926        About your hospital stay     You were admitted on:  October 4, 2018 You last received care in the:  [facility]    You were discharged on:  October 5, 2018        Reason for your hospital stay       Altered mental status                  Who to Call     For medical emergencies, please call 911.  For non-urgent questions about your medical care, please call your primary care provider or clinic, [phone]          Attending Provider     Provider Specialty    Faheem Perea DO HOSPITALIST    Jose Antonio Hidalgo MD Internal Medicine       Primary Care Provider Office Phone # Fax #    Giovani Segura [phone]      After Care Instructions     Activity       Your activity upon discharge: activity as tolerated            Diet       Follow this diet upon discharge: Moderate consistent carbohydrate (2943-1635 jameel / 4-6 CHO units per meal)                  Follow-up Appointments     Follow-up and recommended labs and tests        Follow up with primary care provider, Giovani Segura, within 7 days for hospital follow- up.                  Pending Results     No orders found for last 3 day(s).            Admission Information     Date & Time Provider Department Dept. Phone    10/4/2018 Jose Antonio Hidalgo MD [facility] [phone]      Your Vitals Were     Blood Pressure Pulse Temperature Respirations Height Weight    105/61 98 96.3  F (35.7  " C) (Oral) 20 1.829 m (6') 105.3 kg (232 lb 3.2 oz)    Pulse Oximetry BMI (Body Mass Index)                96% 31.49 kg/m2          [email or web address] Information     [email or web address] lets you send messages to your doctor, view your test results, renew your prescriptions, schedule appointments and more. To sign up, go to [email or web address] . Click on \"Log in\" on the left side of the screen, which will take you to the Welcome page. Then click on \"Sign up Now\" on the right side of the page.     You will be asked to enter the access code listed below, as well as some personal information. Please follow the directions to create your username and password.     Your access code is: [email or web address]  Expires: 1/3/2019  4:45 PM     Your access code will  in 90 days. If you need help or a new code, please call your [facility] clinic or [phone].        Care EveryWhere ID     This is your Care EveryWhere ID. This could be used by other organizations to access your [facility] medical records  [id number]        Equal Access to Services     BECK HARE AH: Hadii brigida Huntley, waloan gomez, qapipo kaalradha london, cely elder . So [facility] [phone].    ATENCIÓN: Si habla español, tiene a sue disposición servicios gratuitos de asistencia lingüística. Sulaiman al [phone].    We comply with applicable federal civil rights laws and [place] laws. We do not discriminate on the basis of race, color, national origin, age, disability, sex, sexual orientation, or gender identity.               Review of your medicines      START taking        Dose / Directions    levETIRAcetam 500 MG tablet   Commonly known as:  KEPPRA        Dose:  500 mg   Take 1 tablet (500 mg) by mouth 2 times daily   Quantity:  60 tablet   Refills:  0         CONTINUE these medicines which have NOT CHANGED        Dose / Directions    apixaban ANTICOAGULANT 5 MG tablet   Commonly known as:  ELIQUIS        Dose:  5 mg   Take 5 mg by " mouth 2 times daily   Refills:  0       atorvastatin 20 MG tablet   Commonly known as:  LIPITOR        Dose:  20 mg   Take 20 mg by mouth once a week On Fridays.   Refills:  0       brimonidine-timolol 0.2-0.5 % ophthalmic solution   Commonly known as:  COMBIGAN        Dose:  1 drop   Place 1 drop into both eyes 2 times daily   Refills:  0       calcium carbonate 500 mg-vitamin D 200 units 500-200 MG-UNIT per tablet   Commonly known as:  OSCAL with D;OYSTER SHELL CALCIUM        Dose:  1 tablet   Take 1 tablet by mouth daily (with lunch)   Refills:  0       cholecalciferol 1000 UNIT tablet   Commonly known as:  vitamin D3        Dose:  2000 Units   Take 2,000 Units by mouth daily   Refills:  0       fish oil-omega-3 fatty acids 1000 MG capsule        Dose:  1 g   Take 1 g by mouth daily   Refills:  0       * furosemide 40 MG tablet   Commonly known as:  LASIX        Dose:  40 mg   Take 40 mg by mouth every other day Alternating with 80mg every other day.   Refills:  0       * furosemide 40 MG tablet   Commonly known as:  LASIX        Dose:  80 mg   Take 80 mg by mouth every other day Alternating with 40mg Lasix every other day.   Refills:  0       isosorbide mononitrate 15 mg Tabs   Commonly known as:  IMDUR        Dose:  15 mg   Take 15 mg by mouth 2 times daily   Refills:  0       loperamide 1 MG/5ML liquid   Commonly known as:  IMODIUM        Dose:  3 mg   Take 3 mg by mouth twice a week Can take with atorvastatin to prevent diarrhea. Takes Friday yong with Lipitor and Tuesdays   Refills:  0       metFORMIN 500 MG tablet   Commonly known as:  GLUCOPHAGE        Dose:  500 mg   Take 500 mg by mouth 3 times daily (with meals)   Refills:  0       multivitamin Tabs tablet        Dose:  1 tablet   Take 1 tablet by mouth 2 times daily   Refills:  0       nitroGLYcerin 0.4 MG sublingual tablet   Commonly known as:  NITROSTAT        Dose:  0.4 mg   Place 0.4 mg under the tongue every 5 minutes as needed for chest pain For  chest pain place 1 tablet under the tongue every 5 minutes for 3 doses. If symptoms persist 5 minutes after 1st dose call 911.   Refills:  0       ranibizumab 0.5 MG/0.05ML Soln   Commonly known as:  LUCENTIS        Dose:  0.5 mg   0.5 mg by Intravitreal route every 2 months To the Right Eye.   Refills:  0       sotalol 80 MG tablet   Commonly known as:  BETAPACE        Dose:  80 mg   Take 80 mg by mouth 2 times daily   Refills:  0       travoprost (BAK Free) 0.004 % ophthalmic solution   Commonly known as:  TRAVATAN Z        Dose:  1 drop   Place 1 drop into both eyes At Bedtime   Refills:  0       umeclidinium-vilanterol 62.5-25 MCG/INH oral inhaler   Commonly known as:  ANORO ELLIPTA        Dose:  1 puff   Inhale 1 puff into the lungs daily   Refills:  0       * Notice:  This list has 2 medication(s) that are the same as other medications prescribed for you. Read the directions carefully, and ask your doctor or other care provider to review them with you.         Where to get your medicines      These medications were sent to [facility] [id number] [place] [address] - [address] AT [facility] [address], [facility] [address]    Hours:  called pharm.  they do accept faxes Phone:  [phone]     levETIRAcetam 500 MG tablet                Protect others around you: Learn how to safely use, store and throw away your medicines at www.disposemymeds.org.             Medication List: This is a list of all your medications and when to take them. Check marks below indicate your daily home schedule. Keep this list as a reference.      Medications           Morning Afternoon Evening Bedtime As Needed    apixaban ANTICOAGULANT 5 MG tablet   Commonly known as:  ELIQUIS   Take 5 mg by mouth 2 times daily   Last time this was given:  5 mg on 10/5/2018  8:24 AM                                atorvastatin 20 MG tablet   Commonly known as:  LIPITOR   Take 20 mg by mouth  once a week On Fridays.                                brimonidine-timolol 0.2-0.5 % ophthalmic solution   Commonly known as:  COMBIGAN   Place 1 drop into both eyes 2 times daily   Last time this was given:  1 drop on 10/5/2018  8:25 AM                                calcium carbonate 500 mg-vitamin D 200 units 500-200 MG-UNIT per tablet   Commonly known as:  OSCAL with D;OYSTER SHELL CALCIUM   Take 1 tablet by mouth daily (with lunch)                                cholecalciferol 1000 UNIT tablet   Commonly known as:  vitamin D3   Take 2,000 Units by mouth daily                                fish oil-omega-3 fatty acids 1000 MG capsule   Take 1 g by mouth daily                                * furosemide 40 MG tablet   Commonly known as:  LASIX   Take 40 mg by mouth every other day Alternating with 80mg every other day.   Last time this was given:  40 mg on 10/5/2018  8:24 AM                                * furosemide 40 MG tablet   Commonly known as:  LASIX   Take 80 mg by mouth every other day Alternating with 40mg Lasix every other day.   Last time this was given:  40 mg on 10/5/2018  8:24 AM                                isosorbide mononitrate 15 mg Tabs   Commonly known as:  IMDUR   Take 15 mg by mouth 2 times daily   Last time this was given:  15 mg on 10/5/2018 12:20 PM                                levETIRAcetam 500 MG tablet   Commonly known as:  KEPPRA   Take 1 tablet (500 mg) by mouth 2 times daily                                loperamide 1 MG/5ML liquid   Commonly known as:  IMODIUM   Take 3 mg by mouth twice a week Can take with atorvastatin to prevent diarrhea. Takes Friday yong with Lipitor and Tuesdays                                metFORMIN 500 MG tablet   Commonly known as:  GLUCOPHAGE   Take 500 mg by mouth 3 times daily (with meals)   Last time this was given:  500 mg on 10/5/2018  8:24 AM                                multivitamin Tabs tablet   Take 1 tablet by mouth 2 times daily                                 nitroGLYcerin 0.4 MG sublingual tablet   Commonly known as:  NITROSTAT   Place 0.4 mg under the tongue every 5 minutes as needed for chest pain For chest pain place 1 tablet under the tongue every 5 minutes for 3 doses. If symptoms persist 5 minutes after 1st dose call 911.                                ranibizumab 0.5 MG/0.05ML Soln   Commonly known as:  LUCENTIS   0.5 mg by Intravitreal route every 2 months To the Right Eye.                                sotalol 80 MG tablet   Commonly known as:  BETAPACE   Take 80 mg by mouth 2 times daily   Last time this was given:  80 mg on 10/5/2018  8:24 AM                                travoprost (BAK Free) 0.004 % ophthalmic solution   Commonly known as:  TRAVATAN Z   Place 1 drop into both eyes At Bedtime   Last time this was given:  1 drop on 10/4/2018  8:16 PM                                umeclidinium-vilanterol 62.5-25 MCG/INH oral inhaler   Commonly known as:  ANORO ELLIPTA   Inhale 1 puff into the lungs daily   Last time this was given:  1 puff on 10/5/2018  8:28 AM                                * Notice:  This list has 2 medication(s) that are the same as other medications prescribed for you. Read the directions carefully, and ask your doctor or other care provider to review them with you.

## 2018-10-04 NOTE — H&P
[facility]    History and Physical  Hospitalist       Date of Admission:  10/4/2018    Assessment & Plan   Hola Garcia is a 89 year old male with PMH of pulmonary hypertension on chronic O2, SSS s/p pacemaker, paroxysmal afib, HTN, HLD, DM2, hx CVA 2010, recent TIA 7/2018 and 8/2018, who presents with acute confusional state, suspected TIA.    Suspected transient ischemic attack  Acute confusional state, resolved  Patient reports this is his third episode. In 7/2018 he presented with dizziness, weakness, bradycardia; there was concern for symptomatic bradycardia however it was later felt that he had a TIA. In 8/2018 he presented with dizziness, lightheadedness, and confusion lasting 20 minutes which resolved, was briefly admitted for TIA without significant w/u. He now presents with what sounds like similar symptoms however they are more severe in that he had loss of consciousness. CT and CTA without evidence of acute stroke. MRI not done due to pacemaker. Patient's confusion reportedly resolved at [facility] ED. Unfortunately, patient's wife's contact was not available so she could not be contacted for corroborating information.   The etiology of the TIA is unclear; patient reports decreased burden of afib due to being on sotalol, and he takes apixaban BID regularly. It's also possible that these spells are not primarily neurologic; however, he does not have any documented arrhythmias on ICD checks other than his afib.   - Needs further chart checking and corroboration with family; [facility] ED provider note not entered yet  - Q4h neuro checks, Tele  - Trend trops x2  - Neurology consult for TIA  - Echocardiogram  - PT/OT ordered  - Okay to have consistent carb diet    Pulmonary hypertension with chronic respiratory failure on O2: Pulm HTN from restrictive lung disease.  - Continue O2  - Continue PTA Lasix 40/80mg alternating daily  - PTA Anoro Ellipta    Paroxysmal atrial fibrillation  SSS s/p  "pacemaker  Diagnosed in 2015, initially persistent. Had a pacemaker placed and then started on sotalol. Patient reports burden of afib significantly decreased since starting sotalol.  - Continue PTA apixaban  - Continue Sotalol 80mg BID    HLD: PTA Lipitor    DM2: A1c 7.2% in 4/2018.  - Resume metformin  - Medium sliding scale insulin here    DVT Prophylaxis: Apixaban  Code Status: Full Code    Disposition: Expected discharge in 1-2 days    Jose Antonio Hidalgo MD    Primary Care Physician    No primary care provider on file.    Chief Complaint   Confusion    History is obtained from the patient    History of Present Illness   Hola Garcia is a 89 year old male who presents with loss of consciousness. Patient notes that around 10:30 this morning, he was preparing for breakfast. He felt \"funny\" and then blacked out and doesn't remember anything until he was at [facility]. He also noted that he was disoriented at first and didn't even recognize his son who walked in to his room. This improved and now he feels back to his normal baseline. Denies any CP, SOB, confusion, headaches, recent illnesses. He notes that this is the third time something like this has happened to him, but this was by far the worst episode since he lost consciousness and doesn't remember what happened.    Of note, patient's wife's contact was not available so she could not be contacted for corroborating information.    Past Medical History    I have reviewed this patient's medical history and updated it with pertinent information if needed.   No past medical history on file.    Past Surgical History   I have reviewed this patient's surgical history and updated it with pertinent information if needed.  No past surgical history on file.    Prior to Admission Medications   Prior to Admission Medications   Prescriptions Last Dose Informant Patient Reported? Taking?   apixaban ANTICOAGULANT (ELIQUIS) 5 MG tablet   Yes Yes   Sig: Take 5 mg by " mouth 2 times daily   atorvastatin (LIPITOR) 20 MG tablet   Yes Yes   Sig: Take 20 mg by mouth once a week On .   brimonidine-timolol (COMBIGAN) 0.2-0.5 % ophthalmic solution   Yes Yes   Sig: Place 1 drop into both eyes 2 times daily   calcium carbonate 500 mg-vitamin D 200 units (OSCAL WITH D;OYSTER SHELL CALCIUM) 500-200 MG-UNIT per tablet   Yes Yes   Sig: Take 1 tablet by mouth daily (with lunch)   cholecalciferol (VITAMIN D3) 1000 UNIT tablet   Yes Yes   Sig: Take 2,000 Units by mouth daily   fish oil-omega-3 fatty acids 1000 MG capsule   Yes Yes   Sig: Take 2 g by mouth daily   furosemide (LASIX) 40 MG tablet   Yes Yes   Sig: Take 40 mg by mouth every other day Alternating with 80mg every other day.   furosemide (LASIX) 40 MG tablet   Yes Yes   Sig: Take 80 mg by mouth every other day Alternating with 40mg Lasix every other day.   loperamide (IMODIUM) 1 MG/5ML liquid   Yes Yes   Sig: Take 2 mg by mouth 4 times daily as needed for diarrhea Can take with atorvastatin to prevent diarrhea.   metFORMIN (GLUCOPHAGE) 500 MG tablet   Yes Yes   Sig: Take 500 mg by mouth 3 times daily (with meals)   multivitamin (OCUVITE) TABS tablet   Yes Yes   Sig: Take 1 tablet by mouth 2 times daily   nitroGLYcerin (NITROSTAT) 0.4 MG sublingual tablet   Yes Yes   Sig: Place 0.4 mg under the tongue every 5 minutes as needed for chest pain For chest pain place 1 tablet under the tongue every 5 minutes for 3 doses. If symptoms persist 5 minutes after 1st dose call 911.   ranibizumab (LUCENTIS) 0.5 MG/0.05ML SOLN   Yes Yes   Si.5 mg by Intravitreal route every 2 months To the Right Eye.   sotalol (BETAPACE) 80 MG tablet   Yes Yes   Sig: Take 80 mg by mouth 2 times daily   travoprost, BAK Free, (TRAVATAN Z) 0.004 % ophthalmic solution   Yes Yes   Sig: Place 1 drop into both eyes At Bedtime   umeclidinium-vilanterol (ANORO ELLIPTA) 62.5-25 MCG/INH oral inhaler   Yes Yes   Sig: Inhale 1 puff into the lungs daily       Facility-Administered Medications: None     Allergies   Allergies   Allergen Reactions     Codeine Other (See Comments)     Gets sicker on the medication      Oxycodone-Acetaminophen Nausea     nausea       Social History   I have reviewed this patient's social history and updated it with pertinent information if needed. Hola Garcia  reports that he has quit smoking. He does not have any smokeless tobacco history on file. He reports that he does not drink alcohol.    Family History   I have reviewed this patient's family history and updated it with pertinent information if needed.   Family History   Problem Relation Age of Onset     Cerebrovascular Disease Mother       of a stroke       Review of Systems   The 10 point Review of Systems is negative other than noted in the HPI or here.    Physical Exam   Temp: 95.3  F (35.2  C) Temp src: Oral BP: 150/81 Pulse: 66   Resp: 18 SpO2: 95 % O2 Device: None (Room air)    Vital Signs with Ranges  Temp:  [95.3  F (35.2  C)] 95.3  F (35.2  C)  Pulse:  [66] 66  Resp:  [18] 18  BP: (150-169)/(81-91) 150/81  SpO2:  [95 %] 95 %  232 lbs 3.2 oz    Constitutional: Elderly male in NAD, appears younger than stated age  Eyes: PERRL, nonicteric, normal ocular movements  HEENT: Normocephalic, atraumatic, oral mucosa moist  Respiratory: CTAB, no wheezing or crackles  Cardiovascular: RRR, normal S1/2, no m/r/g  GI: No organomegaly, normoactive bowel sounds, nontender, nondistended  Vascular: Trace lower extremity pitting edema  Skin: No rashes or scars  Musculoskeletal: Moves all extremities  Neurologic: A&Ox3, no intention tremor, 5/5 strength in UE and LE, CN I-XII grossly normal  Psychiatric: Appropriate affect and mood    Data   Data reviewed today:  I personally reviewed text results from [facility] ED  .  No lab results found in last 7 days.    Imaging:  No results found for this or any previous visit (from the past 24 hour(s)).

## 2018-10-04 NOTE — PLAN OF CARE
Problem: Patient Care Overview  Goal: Plan of Care/Patient Progress Review  Outcome: No Change  Pt is transferred from [facility] .with TIA episode and SOB.On 2L of oxygen.Pt is alert and oriented now.Settle the pt in the bed.Waiting for doctors order.will monitor.

## 2018-10-04 NOTE — IP AVS SNAPSHOT
[facility]    [address] [place] [id number]    Phone:  [phone]                                       After Visit Summary   10/4/2018    Hola Garcia    MRN: [id number]           After Visit Summary Signature Page     I have received my discharge instructions, and my questions have been answered. I have discussed any challenges I see with this plan with the nurse or doctor.    ..........................................................................................................................................  Patient/Patient Representative Signature      ..........................................................................................................................................  Patient Representative Print Name and Relationship to Patient    ..................................................               ................................................  Date                                   Time    ..........................................................................................................................................  Reviewed by Signature/Title    ...................................................              ..............................................  Date                                               Time          22EPIC Rev 08/18

## 2018-10-05 ENCOUNTER — APPOINTMENT (OUTPATIENT)
Dept: OCCUPATIONAL THERAPY | Facility: CLINIC | Age: 83
End: 2018-10-05
Attending: INTERNAL MEDICINE
Payer: MEDICARE

## 2018-10-05 ENCOUNTER — APPOINTMENT (OUTPATIENT)
Dept: CT IMAGING | Facility: CLINIC | Age: 83
End: 2018-10-05
Attending: PSYCHIATRY & NEUROLOGY
Payer: MEDICARE

## 2018-10-05 ENCOUNTER — APPOINTMENT (OUTPATIENT)
Dept: CARDIOLOGY | Facility: CLINIC | Age: 83
End: 2018-10-05
Attending: INTERNAL MEDICINE
Payer: MEDICARE

## 2018-10-05 VITALS
HEIGHT: 72 IN | DIASTOLIC BLOOD PRESSURE: 61 MMHG | TEMPERATURE: 96.3 F | WEIGHT: 232.2 LBS | HEART RATE: 98 BPM | BODY MASS INDEX: 31.45 KG/M2 | SYSTOLIC BLOOD PRESSURE: 105 MMHG | OXYGEN SATURATION: 96 % | RESPIRATION RATE: 20 BRPM

## 2018-10-05 LAB
ANION GAP SERPL CALCULATED.3IONS-SCNC: 5 MMOL/L (ref 3–14)
BUN SERPL-MCNC: 15 MG/DL (ref 7–30)
CALCIUM SERPL-MCNC: 8.5 MG/DL (ref 8.5–10.1)
CHLORIDE SERPL-SCNC: 106 MMOL/L (ref 94–109)
CO2 SERPL-SCNC: 31 MMOL/L (ref 20–32)
CREAT SERPL-MCNC: 0.8 MG/DL (ref 0.66–1.25)
ERYTHROCYTE [DISTWIDTH] IN BLOOD BY AUTOMATED COUNT: 12.8 % (ref 10–15)
GFR SERPL CREATININE-BSD FRML MDRD: 90 ML/MIN/1.7M2
GLUCOSE BLDC GLUCOMTR-MCNC: 123 MG/DL (ref 70–99)
GLUCOSE BLDC GLUCOMTR-MCNC: 153 MG/DL (ref 70–99)
GLUCOSE BLDC GLUCOMTR-MCNC: 225 MG/DL (ref 70–99)
GLUCOSE BLDC GLUCOMTR-MCNC: 241 MG/DL (ref 70–99)
GLUCOSE SERPL-MCNC: 103 MG/DL (ref 70–99)
HCT VFR BLD AUTO: 34.2 % (ref 40–53)
HGB BLD-MCNC: 11 G/DL (ref 13.3–17.7)
MCH RBC QN AUTO: 30.2 PG (ref 26.5–33)
MCHC RBC AUTO-ENTMCNC: 32.2 G/DL (ref 31.5–36.5)
MCV RBC AUTO: 94 FL (ref 78–100)
PLATELET # BLD AUTO: 176 10E9/L (ref 150–450)
POTASSIUM SERPL-SCNC: 3.8 MMOL/L (ref 3.4–5.3)
RBC # BLD AUTO: 3.64 10E12/L (ref 4.4–5.9)
SODIUM SERPL-SCNC: 142 MMOL/L (ref 133–144)
TROPONIN I SERPL-MCNC: <0.015 UG/L (ref 0–0.04)
WBC # BLD AUTO: 6.1 10E9/L (ref 4–11)

## 2018-10-05 PROCEDURE — 00000146 ZZHCL STATISTIC GLUCOSE BY METER IP

## 2018-10-05 PROCEDURE — 25000132 ZZH RX MED GY IP 250 OP 250 PS 637: Mod: GY | Performed by: INTERNAL MEDICINE

## 2018-10-05 PROCEDURE — 97530 THERAPEUTIC ACTIVITIES: CPT | Mod: GO

## 2018-10-05 PROCEDURE — A9270 NON-COVERED ITEM OR SERVICE: HCPCS | Mod: GY | Performed by: INTERNAL MEDICINE

## 2018-10-05 PROCEDURE — 97165 OT EVAL LOW COMPLEX 30 MIN: CPT | Mod: GO

## 2018-10-05 PROCEDURE — 36415 COLL VENOUS BLD VENIPUNCTURE: CPT | Performed by: INTERNAL MEDICINE

## 2018-10-05 PROCEDURE — A9270 NON-COVERED ITEM OR SERVICE: HCPCS | Performed by: PHYSICIAN ASSISTANT

## 2018-10-05 PROCEDURE — 40000133 ZZH STATISTIC OT WARD VISIT

## 2018-10-05 PROCEDURE — G8989 SELF CARE D/C STATUS: HCPCS | Mod: GO,CI

## 2018-10-05 PROCEDURE — 25000131 ZZH RX MED GY IP 250 OP 636 PS 637: Performed by: INTERNAL MEDICINE

## 2018-10-05 PROCEDURE — G8987 SELF CARE CURRENT STATUS: HCPCS | Mod: GO,CI

## 2018-10-05 PROCEDURE — 25000132 ZZH RX MED GY IP 250 OP 250 PS 637: Performed by: PHYSICIAN ASSISTANT

## 2018-10-05 PROCEDURE — 40000061 ZZH STATISTIC EEG TIME EA 10 MIN

## 2018-10-05 PROCEDURE — 97535 SELF CARE MNGMENT TRAINING: CPT | Mod: GO

## 2018-10-05 PROCEDURE — G8988 SELF CARE GOAL STATUS: HCPCS | Mod: GO,CI

## 2018-10-05 PROCEDURE — 80048 BASIC METABOLIC PNL TOTAL CA: CPT | Performed by: INTERNAL MEDICINE

## 2018-10-05 PROCEDURE — 85027 COMPLETE CBC AUTOMATED: CPT | Performed by: INTERNAL MEDICINE

## 2018-10-05 PROCEDURE — 95816 EEG AWAKE AND DROWSY: CPT

## 2018-10-05 PROCEDURE — 70450 CT HEAD/BRAIN W/O DYE: CPT

## 2018-10-05 PROCEDURE — 25500064 ZZH RX 255 OP 636: Performed by: INTERNAL MEDICINE

## 2018-10-05 PROCEDURE — 96372 THER/PROPH/DIAG INJ SC/IM: CPT

## 2018-10-05 PROCEDURE — 99214 OFFICE O/P EST MOD 30 MIN: CPT | Performed by: PSYCHIATRY & NEUROLOGY

## 2018-10-05 PROCEDURE — 93306 TTE W/DOPPLER COMPLETE: CPT | Mod: 26 | Performed by: INTERNAL MEDICINE

## 2018-10-05 PROCEDURE — 84484 ASSAY OF TROPONIN QUANT: CPT | Performed by: INTERNAL MEDICINE

## 2018-10-05 PROCEDURE — 99217 ZZC OBSERVATION CARE DISCHARGE: CPT | Performed by: PHYSICIAN ASSISTANT

## 2018-10-05 PROCEDURE — 93306 TTE W/DOPPLER COMPLETE: CPT

## 2018-10-05 PROCEDURE — G0378 HOSPITAL OBSERVATION PER HR: HCPCS

## 2018-10-05 RX ORDER — LEVETIRACETAM 500 MG/1
500 TABLET ORAL 2 TIMES DAILY
Status: DISCONTINUED | OUTPATIENT
Start: 2018-10-05 | End: 2018-10-05 | Stop reason: HOSPADM

## 2018-10-05 RX ORDER — LEVETIRACETAM 500 MG/1
500 TABLET ORAL 2 TIMES DAILY
Qty: 60 TABLET | Refills: 0 | Status: SHIPPED | OUTPATIENT
Start: 2018-10-05

## 2018-10-05 RX ADMIN — METFORMIN HYDROCHLORIDE 500 MG: 500 TABLET ORAL at 08:24

## 2018-10-05 RX ADMIN — INSULIN ASPART 1 UNITS: 100 INJECTION, SOLUTION INTRAVENOUS; SUBCUTANEOUS at 01:06

## 2018-10-05 RX ADMIN — BRIMONIDINE TARTRATE AND TIMOLOL MALEATE 1 DROP: 2; 5 SOLUTION OPHTHALMIC at 08:25

## 2018-10-05 RX ADMIN — APIXABAN 5 MG: 5 TABLET, FILM COATED ORAL at 08:24

## 2018-10-05 RX ADMIN — HUMAN ALBUMIN MICROSPHERES AND PERFLUTREN 3 ML: 10; .22 INJECTION, SOLUTION INTRAVENOUS at 10:28

## 2018-10-05 RX ADMIN — FUROSEMIDE 40 MG: 40 TABLET ORAL at 08:24

## 2018-10-05 RX ADMIN — ISOSORBIDE MONONITRATE 15 MG: 30 TABLET, EXTENDED RELEASE ORAL at 12:20

## 2018-10-05 RX ADMIN — SOTALOL HYDROCHLORIDE 80 MG: 80 TABLET ORAL at 08:24

## 2018-10-05 NOTE — PLAN OF CARE
Problem: Patient Care Overview  Goal: Plan of Care/Patient Progress Review  Outcome: No Change  PRIMARY DIAGNOSIS: SYNCOPE/TIA  OUTPATIENT/OBSERVATION GOALS TO BE MET BEFORE DISCHARGE:  1. Orthostatic performed: NA    2. Diagnostic testing complete & at baseline neurologic testing: No    3. Cleared by consultants (if involved): No    4. Interpretation of cardiac rhythm per telemetry tech: SR    5. Tolerating adequate PO diet and medications: Yes    6. Return to near baseline physical activity or neurologic status: Yes    Discharge Planner Nurse   Safe discharge environment identified: No  Barriers to discharge: Yes       Entered by: Evette Ramon 10/05/2018 8:39 AM     Please review provider order for any additional goals.   Nurse to notify provider when observation goals have been met and patient is ready for discharge.    -diagnostic tests and consults completed and resulted - not met  -vital signs normal or at patient baseline - partially met  -returns to baseline functional status - met  - seen by neuro - not met

## 2018-10-05 NOTE — PLAN OF CARE
Problem: Patient Care Overview  Goal: Plan of Care/Patient Progress Review  Outcome: No Change  PRIMARY DIAGNOSIS: SYNCOPE/TIA  OUTPATIENT/OBSERVATION GOALS TO BE MET BEFORE DISCHARGE:  1. Orthostatic performed: NA    2. Diagnostic testing complete & at baseline neurologic testing: yes    3. Cleared by consultants (if involved): No    4. Interpretation of cardiac rhythm per telemetry tech: SR    5. Tolerating adequate PO diet and medications: Yes    6. Return to near baseline physical activity or neurologic status: Yes    Discharge Planner Nurse   Safe discharge environment identified: No  Barriers to discharge: Yes       Entered by: Evette Ramon 10/05/2018 12:22 PM     Please review provider order for any additional goals.   Nurse to notify provider when observation goals have been met and patient is ready for discharge.    -diagnostic tests and consults completed and resulted - not met  -vital signs normal or at patient baseline - partially met  -returns to baseline functional status - met  - seen by neuro - not met

## 2018-10-05 NOTE — CONSULTS
"VASCULAR NEUROLOGY ATTENDING ATTESTATION    Admission Summary  Hola Garcia is an 89 year old male with prior stroke (L. PCA), pulmonary HTN, Afib (apixaban) and sotalol, SSS s/p pacemaker who presents after an episode of \"unresponsiveness\".  Upon talking to his wife, what she actually witnessed is him holding his arms in front of him \"like a puppy dog\" and speaking gibberish during which time he was unresponsive to his wife.  He is amnestic to the event.  Afterwards he was confused and remembers that he did not recognize his son initially in the ED at [facility], thinking he was a doctor.  He has had one prior episode of receptive aphasia for which he was also evaluated at [facility].  Head CT/CTA unrevealing, repeat Head CT did not show any acute stroke--unable to obtain MRI.  Routine EEG: unrevealing  Last 24 hours:  Back to normal    Impression:   1. 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 Keppra 500mg BID  2. Routine follow-up with [facility]    Fracisco Bonilla MD, MS  Neurology    Please contact the stroke service with any questions: link to Text page    I, Fracisco Bonilla, was present with the medical student who participated in obtaining the history, performing the exam, and in the documentation of the note.  I have verified the history, personally reviewed the vitals, labs, neuroimaging, medications, examined the patient, and led medical decision making.  I agree with the assessment and plan documented in the student's note below.  My relevant summary and ham findings are documented above.    I have personally spent a total of 60 minutes consulting with his medical providers and assessing the patient today, with more than 50% of this time spent in consultation, coordination of care, and discussion with the patient and/or family " "regarding diagnostic results, prognosis, symptom management, risks and benefits of management options, and development of plan of care.      ___________________________      Neurology Consultation    Hola Garcia MRN# [id number]   YOB: 1926 Age: 89 year old   Date of Admission: 10/4/2018     Reason for consult: I was asked by Dr. Hidalgo to evaluate this patient for possible TIA.           Assessment and Plan:   89 year old male with medical history including old CVA, pulmonary HTN on oxygen, paroxysmal a fib on apixiban and sotalol, SSS with pacemaker who presents after episode of aphasia and confusion yesterday, now resolved. Differential includes seizure, TIA, pacemaker malfunction/a fib/sick sinus syndrome, migraine with aura (he denies history). With his positive symptoms, negative head CT, history of old stroke, seizure is most likely.     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 He had been filling his pill box yesterday morning, sitting down when he began to repeat \"I need help\" to his wife. He was unable to say anything else, although intermittently he was counting. Was not following commands. He has no recollection of this incident, history provided by his wife. She reports he was holding his hands in front of his chest in a strange position, but didn't see any twitching, shaking, tremor.     She called EMS who brought him to [facility] ED. Head CT showed no acute pathology and lab workup was overall normal. At some point in the ED he became more responsive. Apparently he was able to speak first but " didn't recognize his son for some time. Doesn't sound like he ever lost consciousness. By the evening, he was back to mental baseline without any motor or sensory deficits. He could remember the day prior to and after the episode, but nothing in between.     In July and August of this year he was worked up for TIA after two episodes of dizziness, confusion. He and his wife attribute these to afib. He thinks he had a cardiology appointment last a few months ago at which time his pacer report indicated good function. Per notes, one episode of dizziness/confusion was attributed to a fib with RVR, and a second involved 30 minutes of expressive aphasia that resolved without CT evidence of ischemia. He denies history of seizures.              Past Medical History:   Pulmonary hypertension on chronic oxygen  Paroxysmal Atrial fibrillation  Sick sinus syndrome with pacemaker  HTN  hyperlipidemia  DMtype 2  Hx CVA (left occipital)  Diastolic heart failure         Past Surgical History:   Back surgery (residual left leg weakness)  Tonsillectomy  Pacemaker insertion 2015  Eye operation           Social History:   Lives in independent apartment in [facility] with wife. Son lives locally.            Family History:   No family history of seizure          Allergies:   Codeine, oxycodone          Medications:     Prescriptions Prior to Admission   Medication Sig Dispense Refill Last Dose     apixaban ANTICOAGULANT (ELIQUIS) 5 MG tablet Take 5 mg by mouth 2 times daily   ?     atorvastatin (LIPITOR) 20 MG tablet Take 20 mg by mouth once a week On Fridays.   9/28/2018     brimonidine-timolol (COMBIGAN) 0.2-0.5 % ophthalmic solution Place 1 drop into both eyes 2 times daily   ?     calcium carbonate 500 mg-vitamin D 200 units (OSCAL WITH D;OYSTER SHELL CALCIUM) 500-200 MG-UNIT per tablet Take 1 tablet by mouth daily (with lunch)   ?     cholecalciferol (VITAMIN D3) 1000 UNIT tablet Take 2,000 Units by mouth  daily   ?     fish oil-omega-3 fatty acids 1000 MG capsule Take 1 g by mouth daily    ?     furosemide (LASIX) 40 MG tablet Take 40 mg by mouth every other day Alternating with 80mg every other day.   10/3/2018     furosemide (LASIX) 40 MG tablet Take 80 mg by mouth every other day Alternating with 40mg Lasix every other day.   10/2/2018     isosorbide mononitrate (IMDUR) 15 mg TABS Take 15 mg by mouth 2 times daily   ?     loperamide (IMODIUM) 1 MG/5ML liquid Take 3 mg by mouth twice a week Can take with atorvastatin to prevent diarrhea. Takes Friday yong with Lipitor and Tuesdays   ?     metFORMIN (GLUCOPHAGE) 500 MG tablet Take 500 mg by mouth 3 times daily (with meals)   ?     multivitamin (OCUVITE) TABS tablet Take 1 tablet by mouth 2 times daily   ?     nitroGLYcerin (NITROSTAT) 0.4 MG sublingual tablet Place 0.4 mg under the tongue every 5 minutes as needed for chest pain For chest pain place 1 tablet under the tongue every 5 minutes for 3 doses. If symptoms persist 5 minutes after 1st dose call 911.   ?     ranibizumab (LUCENTIS) 0.5 MG/0.05ML SOLN 0.5 mg by Intravitreal route every 2 months To the Right Eye.   ?     sotalol (BETAPACE) 80 MG tablet Take 80 mg by mouth 2 times daily   ?     travoprost, BAK Free, (TRAVATAN Z) 0.004 % ophthalmic solution Place 1 drop into both eyes At Bedtime   ?     umeclidinium-vilanterol (ANORO ELLIPTA) 62.5-25 MCG/INH oral inhaler Inhale 1 puff into the lungs daily   ?          Review of Systems:   The Review of Systems is negative other than noted in the HPI         Neurology Focused Physical Exam:   The following assessments were done and were normal unless otherwise specified:  General Comments:   Alert and oriented, pleasant elderly gentleman.      Mental Status:   Level of consciousness - normal  Orientation - normal  Language - normal  Memory - normal  Attention / concentration - normal  Fund of knowledge - normal   Cranial Nerves:   Cranial nerves II through XII are  intact except for right visual field cut, chronic     Motor:   Muscle bulk - normal  Muscle tone - normal  Muscle strength - normal. Except 3/5 strength in left leg, chronic  Arm drift - none  Speed and dexterity - normal   tremor in left UE with movement   Sensory perception:   Light touch - normal  Rhomberg test - normal     Cerebellar Coordination:   Finger to nose- normal although tremor in left hand   Heel to shin - normal  Trunk stability - normal          Physical Exam:   Vitals were reviewed  Temp: 96.1  F (35.6  C) Temp src: Axillary BP: 106/58 Pulse: 56   Resp: 18 SpO2: 96 % O2 Device: Nasal cannula Oxygen Delivery: 1 LPM  Respiratory-breathing comfortably on nasal cannula. No cough   Abdomen-soft, nontender  Extremities-no edema          Data:   Hgb 11, WBC 6.1, plt 176  BMP wnl   Troponin negative  Lipids nl  Hepatic panel wnl     EKG sinus bradycardia, rate 53. QTc 502    Imaging (report from outside hospital, unable to view images)    HEAD CT 10/4:  1. No acute intracranial abnormality.  2. Old left occipital infarction.  3. Mild chronic microvascular ischemic changes in the cerebral white matter.    HEAD CTA 10/4:   1. Patient motion artifact somewhat compromises evaluation. Within these technical confines: No branch vessel occlusion, high-grade stenosis, aneurysm, or high flow vascular malformation involving proximal [place] vessels.    NECK CTA:  1. Patient motion artifact somewhat compromises evaluation. Within these technical confines:  2. No significant stenosis in the neck vessels based on NASCET criteria.  3. No evidence for dissection.      Repeat Head CT 10/4  IMPRESSION:     1. No evidence of acute intracranial hemorrhage, mass, or herniation.  2. Well-defined area of encephalomalacia within the inferior left occipital lobe likely due to previous infarct.  3. There is generalized atrophy of the brain. White matter changes are present in the cerebral hemispheres that are consistent  with small vessel ischemic disease in this age patient.       Sami Grissom, MS4

## 2018-10-05 NOTE — PLAN OF CARE
Problem: Patient Care Overview  Goal: Plan of Care/Patient Progress Review  Outcome: Improving  Observation Goals:  -diagnostic tests and consults completed and resulted: Met  -vital signs normal or at patient baseline: No Met  -returns to baseline functional status: Met  - seen by neuro:  Not Met  Nurse to notify provider when observation goals have been met and patient is ready for discharge.    Shift Update:  Pt is A/Ox4, VSS on 1L O2 via NC ex. BP slightly elevated, but within parameters, C/o of headache rated 2/10 given tylenol.  NIHSS scale rating 2, d/t (R) periferal vision loss and slight sensation loss on LLE from pervious stroke (8 yrs ago), Neuro intact.  Initial swallow study passed. BS active, AUO. Tolerating reg diet. Up A of 1 w/ walker. D/C pending nuero consult in the morning.

## 2018-10-05 NOTE — PLAN OF CARE
Problem: Patient Care Overview  Goal: Plan of Care/Patient Progress Review  Outcome: No Change  PRIMARY DIAGNOSIS: SYNCOPE/TIA  OUTPATIENT/OBSERVATION GOALS TO BE MET BEFORE DISCHARGE:  1. Orthostatic performed: NA    2. Diagnostic testing complete & at baseline neurologic testing: yes    3. Cleared by consultants (if involved): No    4. Interpretation of cardiac rhythm per telemetry tech: SR    5. Tolerating adequate PO diet and medications: Yes    6. Return to near baseline physical activity or neurologic status: Yes    Discharge Planner Nurse   Safe discharge environment identified: No  Barriers to discharge: Yes       Entered by: Evette Ramon 10/05/2018 4:02 PM     Please review provider order for any additional goals.   Nurse to notify provider when observation goals have been met and patient is ready for discharge.    -diagnostic tests and consults completed and resulted -  met  -vital signs normal or at patient baseline -  met  -returns to baseline functional status - met  - seen by neuro - met

## 2018-10-05 NOTE — PROGRESS NOTES
10/05/18 0922   Quick Adds   Type of Visit Initial Occupational Therapy Evaluation   Living Environment   Lives With spouse   Living Arrangements apartment;independent living facility   Home Accessibility no concerns   Transportation Available family or friend will provide  (pt doesn't drive; spouse does driving)   Living Environment Comment t   Self-Care   Usual Activity Tolerance moderate   Current Activity Tolerance fair   Regular Exercise yes   Activity/Exercise Type walking   Equipment Currently Used at Home oxygen;grab bar;shower chair;raised toilet;walker, rolling  (02 at night 1-3L, HH shower, 4ww)   Activity/Exercise/Self-Care Comment walks daily 2-3X day in facility approx total of 2-4 blocks   Functional Level Prior   Ambulation 1-->assistive equipment   Transferring 1-->assistive equipment   Toileting 1-->assistive equipment   Bathing 1-->assistive equipment   Dressing 0-->independent   Eating 0-->independent   Communication 0-->understands/communicates without difficulty   Swallowing 0-->swallows foods/liquids without difficulty   Cognition 0 - no cognition issues reported   Fall history within last six months no   Prior Functional Level Comment Indep meds, finances, spouse does laundry, driving, they hire cleaning service, eat out all meals in restaurant located bottom of living facility, indep amb with 4ww   General Information   Onset of Illness/Injury or Date of Surgery - Date 10/04/18   Referring Physician Dr. Jose Antonio Hidalgo   Additional Occupational Profile Info/Pertinent History of Current Problem 89 year old male with PMH of pulmonary hypertension on chronic O2, SSS s/p pacemaker, paroxysmal afib, HTN, HLD, DM2, hx CVA 2010, recent TIA 7/2018 and 8/2018, who presents with acute confusional state, suspected TIA.   Precautions/Limitations fall precautions;oxygen therapy device and L/min   Cognitive Status Examination   Orientation orientation to person, place and time   Level of Consciousness alert    Able to Follow Commands WNL/WFL   Personal Safety (Cognitive) WNL/WFL   Cognitive Comment to be assessed   Visual Perception   Visual Perception No deficits were identified   Visual Perception Comments Has glaucoma R eye-gets weekly injections, denies any acute changes. Able to read menu, board on wall without difficulty. Denies blurriness, double vision. NEL, tracking x 4Q, +smooth pursuits   Pain Assessment   Patient Currently in Pain No   Range of Motion (ROM)   ROM Quick Adds No deficits were identified   Strength   Manual Muscle Testing Quick Adds No deficits were identified   Hand Strength   Hand Strength Comments B WFL with hand squeeze   Coordination   Upper Extremity Coordination No deficits were identified   Mobility   Bed Mobility Comments independent   Transfer Skill: Sit to Stand   Level of [place]: Sit/Stand independent   Physical Assist/Nonphysical Assist: Sit/Stand supervision   Assistive Device for Transfer: Sit/Stand rolling walker   Transfer Skill: Toilet Transfer   Level of [place]: Toilet independent   Physical Assist/Nonphysical Assist: Toilet supervision   Assistive Device grab bars;rolling walker   Balance   Balance Comments indep static and dynamic sitting balance during ADL tasks, indep static standing, SBA provided during amb in room and in hallway but no LOB   Lower Body Dressing   Level of [place]: Dress Lower Body other (see comments)   Physical Assist/Nonphysical Assist: Dress Lower Body (donned knee high violet hose indep seated EOB)   Toileting   Level of [place]: Toilet stand-by assist   Physical Assist/Nonphysical Assist: Toilet supervision   Eating/Self Feeding   Level of [place]: Eating independent   Activities of Daily Living Analysis   Impairments Contributing to Impaired Activities of Daily Living balance impaired;cognition impaired;strength decreased  (at admission)   General Therapy Interventions   Planned Therapy Interventions ADL retraining;IADL  "retraining;cognition   Clinical Impression   Criteria for Skilled Therapeutic Interventions Met yes, treatment indicated   OT Diagnosis decreased ADL/IADL performance   Influenced by the following impairments cognition, balance, strength   Assessment of Occupational Performance 1-3 Performance Deficits   Identified Performance Deficits household mgmt, functional mob, med/financial mgmt   Clinical Decision Making (Complexity) Low complexity   Predicted Duration of Therapy Intervention (days/wks) eval and one treatment only on OBS patient   Anticipated Discharge Disposition Home with Assist   Risks and Benefits of Treatment have been explained. Yes   Patient, Family & other staff in agreement with plan of care Yes   [facility] TM \"6 Clicks\"   2016, Trustees of [facility], under license to [employer].  All rights reserved.   6 Clicks Short Forms Daily Activity Inpatient Short Form   [facility]  \"6 Clicks\" Daily Activity Inpatient Short Form   1. Putting on and taking off regular lower body clothing? 4 - None   2. Bathing (including washing, rinsing, drying)? 4 - None   3. Toileting, which includes using toilet, bedpan or urinal? 4 - None   4. Putting on and taking off regular upper body clothing? 4 - None   5. Taking care of personal grooming such as brushing teeth? 4 - None   6. Eating meals? 4 - None   Daily Activity Raw Score (Score out of 24.Lower scores equate to lower levels of function) 24   Total Evaluation Time   Total Evaluation Time (Minutes) 15     "

## 2018-10-05 NOTE — PHARMACY-ADMISSION MEDICATION HISTORY
Admission medication history interview status for the 10/4/2018  admission is complete. See EPIC admission navigator for prior to admission medications     Medication history source reliability:Good    Actions taken by pharmacist (provider contacted, etc): interviewed patient, list from [facility], epic notes     Additional medication history information not noted on PTA med list :None    Medication reconciliation/reorder completed by provider prior to medication history? Yes    Time spent in this activity: 15 min    Prior to Admission medications    Medication Sig Last Dose Taking? Auth Provider   apixaban ANTICOAGULANT (ELIQUIS) 5 MG tablet Take 5 mg by mouth 2 times daily ? Yes Unknown, Entered By History   atorvastatin (LIPITOR) 20 MG tablet Take 20 mg by mouth once a week On Fridays. 9/28/2018 Yes Unknown, Entered By History   brimonidine-timolol (COMBIGAN) 0.2-0.5 % ophthalmic solution Place 1 drop into both eyes 2 times daily ? Yes Unknown, Entered By History   calcium carbonate 500 mg-vitamin D 200 units (OSCAL WITH D;OYSTER SHELL CALCIUM) 500-200 MG-UNIT per tablet Take 1 tablet by mouth daily (with lunch) ? Yes Unknown, Entered By History   cholecalciferol (VITAMIN D3) 1000 UNIT tablet Take 2,000 Units by mouth daily ? Yes Unknown, Entered By History   fish oil-omega-3 fatty acids 1000 MG capsule Take 1 g by mouth daily  ? Yes Unknown, Entered By History   furosemide (LASIX) 40 MG tablet Take 40 mg by mouth every other day Alternating with 80mg every other day. 10/3/2018 Yes Unknown, Entered By History   furosemide (LASIX) 40 MG tablet Take 80 mg by mouth every other day Alternating with 40mg Lasix every other day. 10/2/2018 Yes Unknown, Entered By History   isosorbide mononitrate (IMDUR) 15 mg TABS Take 15 mg by mouth 2 times daily ? Yes Unknown, Entered By History   loperamide (IMODIUM) 1 MG/5ML liquid Take 3 mg by mouth twice a week Can take with atorvastatin to prevent diarrhea. Takes Friday yong with  Lipitor and Tuesdays ? Yes Unknown, Entered By History   metFORMIN (GLUCOPHAGE) 500 MG tablet Take 500 mg by mouth 3 times daily (with meals) ? Yes Unknown, Entered By History   multivitamin (OCUVITE) TABS tablet Take 1 tablet by mouth 2 times daily ? Yes Unknown, Entered By History   nitroGLYcerin (NITROSTAT) 0.4 MG sublingual tablet Place 0.4 mg under the tongue every 5 minutes as needed for chest pain For chest pain place 1 tablet under the tongue every 5 minutes for 3 doses. If symptoms persist 5 minutes after 1st dose call 911. ? Yes Unknown, Entered By History   ranibizumab (LUCENTIS) 0.5 MG/0.05ML SOLN 0.5 mg by Intravitreal route every 2 months To the Right Eye. ? Yes Unknown, Entered By History   sotalol (BETAPACE) 80 MG tablet Take 80 mg by mouth 2 times daily ? Yes Unknown, Entered By History   travoprost, BAK Free, (TRAVATAN Z) 0.004 % ophthalmic solution Place 1 drop into both eyes At Bedtime ? Yes Unknown, Entered By History   umeclidinium-vilanterol (ANORO ELLIPTA) 62.5-25 MCG/INH oral inhaler Inhale 1 puff into the lungs daily ? Yes Unknown, Entered By History

## 2018-10-05 NOTE — PLAN OF CARE
Problem: Patient Care Overview  Goal: Plan of Care/Patient Progress Review  Discharge Planner OT   Patient plan for discharge: home w/spouse  Current status: OT eval and treatment completed on 89yo male admitted under observation status with TIA. Pt A & O x 3, followed multi-step directives consistently, scored 25/30 on SLUMS which falls in mild neurocognitive impairment range (which may or may not be his baseline). Pt reports that he does not drive (spouse does driving) but independently manages his own meds and does their finances on computer. He states his daughter and spouse can check his finance/bill-paying work and oversees meds initially to assure continued accuracy upon return home. Pt ambulates with 4ww at all times at home, uses home 02 at night and when walking outside apartment and down to restaurant (1-3L, less lately now that Afib better controlled). Today he ambulated throughout room and in hallway approx 300ft with FWW SBA, maintained 02 >91% on 2L. When was up to bathroom without 02 it did decrease to 87%. Patient demonstrated toileting task, dressing and retrieval of supplies w/use of walker safe manner SBA.  Pt appears to be at or near baseline level of performance and no further skilled OT intervention planned. Will also cancel PT eval as patient at baseline with mobility using walker as well.   Barriers to return to prior living situation: none noted  Recommendations for discharge: home w/spouse and daughter to monitor pt's med and financial management skills  Rationale for recommendations: pt at or near baseline, due to score on SLUMS above supv recommended       Entered by: Lawrence Gardiner 10/05/2018 11:29 AM

## 2018-10-05 NOTE — PLAN OF CARE
Problem: Patient Care Overview  Goal: Plan of Care/Patient Progress Review  Discharge Planner PT   Patient plan for discharge: Home with spouse, per OT  Current status: PT orders received, chart reviewed, discussed with OT. Pt uses 4WW for all mobility, currently able to ambulate household and hallway distances ~300' with 4WW and SBA without LOB or unsteadiness. Pt appears at or very near near baseline mobility.  Barriers to return to prior living situation: See OT note, none anticipated  Recommendations for discharge: See OT note  Rationale for recommendations: Pt mobilizing at or very near baseline functional status. No skilled IP PT needs identified. Orders completed.       Entered by: Kaye Morley 10/05/2018 12:46 PM

## 2018-10-05 NOTE — PLAN OF CARE
Problem: Patient Care Overview  Goal: Plan of Care/Patient Progress Review  Outcome: No Change  PRIMARY DIAGNOSIS: SYNCOPE/TIA  OUTPATIENT/OBSERVATION GOALS TO BE MET BEFORE DISCHARGE:  1. Orthostatic performed: NA    2. Diagnostic testing complete & at baseline neurologic testing: No    3. Cleared by consultants (if involved): No    4. Interpretation of cardiac rhythm per telemetry tech: SR    5. Tolerating adequate PO diet and medications: Yes    6. Return to near baseline physical activity or neurologic status: Yes    Discharge Planner Nurse   Safe discharge environment identified: No  Barriers to discharge: Yes       Entered by: Ana Gilman 10/05/2018 3:16 AM     Please review provider order for any additional goals.   Nurse to notify provider when observation goals have been met and patient is ready for discharge.    -diagnostic tests and consults completed and resulted - not met  -vital signs normal or at patient baseline - partially met  -returns to baseline functional status - met  - seen by neuro - not met

## 2018-10-05 NOTE — PLAN OF CARE
Problem: Patient Care Overview  Goal: Plan of Care/Patient Progress Review  Outcome: Adequate for Discharge Date Met: 10/05/18  Pt doing well. A/o. VSS. neuro intact. Tolerating diet. Up SBA with walker. Clear for discharge by neuro. New med order received for keppra. Pt and son very anxious to leave the hospital. Discharge paper worked reviewed. Verbalizes understanding. PIV removed. New med order sent to [facility]. Instructed to hold metformin for tonight

## 2018-10-05 NOTE — PLAN OF CARE
Problem: Patient Care Overview  Goal: Plan of Care/Patient Progress Review  Outcome: No Change  AVSS; neuro's intact except for some baseline left leg weakness, (from a surgery 10 years ago per pt); pt denied pain; VSS; pt kept on 1L/NC as pt stated this is what he is on at home at night; pt up with 1 assist and a walker to the bathroom; voiding without difficulty; PT/OT/Neurology consults ordered; Echo Bubble study ordered; blood glucose of 225 covered with 1 unit(s) sliding scale insulin; continue to monitor.

## 2018-10-05 NOTE — PLAN OF CARE
Problem: Patient Care Overview  Goal: Plan of Care/Patient Progress Review  Outcome: No Change  PRIMARY DIAGNOSIS: SYNCOPE/TIA  OUTPATIENT/OBSERVATION GOALS TO BE MET BEFORE DISCHARGE:  1. Orthostatic performed: NA    2. Diagnostic testing complete & at baseline neurologic testing: No    3. Cleared by consultants (if involved): No    4. Interpretation of cardiac rhythm per telemetry tech: SR    5. Tolerating adequate PO diet and medications: Yes    6. Return to near baseline physical activity or neurologic status: Yes    Discharge Planner Nurse   Safe discharge environment identified: No  Barriers to discharge: Yes       Entered by: Ana Gilman 10/05/2018 5:47 AM     Please review provider order for any additional goals.   Nurse to notify provider when observation goals have been met and patient is ready for discharge.    -diagnostic tests and consults completed and resulted - not met  -vital signs normal or at patient baseline - partially met  -returns to baseline functional status - met  - seen by neuro - not met

## 2018-10-06 NOTE — DISCHARGE SUMMARY
"[facility]    Discharge Summary  Hospitalist    Date of Admission:  10/4/2018  Date of Discharge:  10/5/2018  5:29 PM  Discharging Provider: Colt Cordoba  Date of Service (when I saw the patient): 10/5/18    Discharge Diagnoses   Probable complex partial seizure  Acute confusional state, resolved  Pulmonary hypertension with chronic respiratory failure on O2  Paroxysmal atrial fibrillation  SSS s/p pacemaker  Hyperlipidemia  Type 2 diabetes mellitus    History of Present Illness   Hola Garcia is a 89 year old male who presents with loss of consciousness. Patient notes that around 10:30 this morning, he was preparing for breakfast. He felt \"funny\" and then blacked out and doesn't remember anything until he was at [facility]. He also noted that he was disoriented at first and didn't even recognize his son who walked in to his room. This improved and now he feels back to his normal baseline. Denies any CP, SOB, confusion, headaches, recent illnesses. He notes that this is the third time something like this has happened to him, but this was by far the worst episode since he lost consciousness and doesn't remember what happened.  He was evaluated in the emergency department and subsequently admitted to observation care.    Hospital Course   Probable complex partial seizure  Acute confusional state, resolved  Patient reports this is his third episode. In 7/2018 he presented with dizziness, weakness, bradycardia; there was concern for symptomatic bradycardia however it was later felt that he had a TIA. In 8/2018 he presented with dizziness, lightheadedness, and confusion lasting 20 minutes which resolved, was briefly admitted for TIA without significant w/u. He now presents with what sounds like similar symptoms however they are more severe in that he had loss of consciousness. CT and CTA without evidence of acute stroke. MRI not done due to pacemaker. Patient's confusion reportedly " resolved at [facility] ED.  The etiology of the TIA was unclear; patient reports decreased burden of afib due to being on sotalol, and he takes apixaban BID regularly.  A primary neurological cause was considered as well as potentially arrhythmia.  The workup is as follows:  - Monitored on telemetry without event.  Normal sinus rhythm.  - Neurochecks remained stable without any new focal abnormalities.  - Serial troponin negative x2  - EEG was obtained which was normal.  There are no focal pathological slowing or epileptiform abnormalities.  - Echocardiogram  with LVEF 55-60%, right ventricle moderately dilated, moderately decreased right ventricular systolic function, no atrial shunt seen, severe pulmonary hypertension, mildly dilated ascending aorta.  - Neurology consulted. It is probable patient has complex partial seizure given positive symptoms and amnesia with one prior event.  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  - Start Keppra 500 mg twice daily.  - Patient's mental status has cleared and he feels completely back to baseline  - Physical therapy evaluation completed.  No skilled PT needs identified.  - Follow-up closely with primary care provider and routine follow-up with [facility]      Pulmonary hypertension with chronic respiratory failure on O2: Pulm HTN from restrictive lung disease.  - Echocardiogram as above  - Continue chronic O2  - Continue PTA Lasix 40/80mg alternating daily  - PTA Anoro Ellipta      Paroxysmal atrial fibrillation  SSS s/p pacemaker  Diagnosed in 2015, initially persistent. Had a pacemaker placed and then started on sotalol. Patient reports burden of afib significantly decreased since starting sotalol.  - Continue PTA apixaban  - Continue Sotalol 80mg BID      HLD: PTA Lipitor      DM2: A1c 7.2% in 4/2018.  - Hold metformin-received contrast dye.  Patient can resume on 10/6 at home.    Colt Cordoba,  PA-C    Significant Results and Procedures   CT/CTA head, EEG, echocardiogram with results as discussed above.    Pending Results   None    Code Status   Full Code       Primary Care Physician   Giovani Segura    Physical Exam   Temp: 96.1  F (35.6  C) Temp src: Axillary BP: 106/58 Pulse: 56   Resp: 18 SpO2: 96 % O2 Device: Nasal cannula Oxygen Delivery: 1 LPM      Vitals:     10/04/18 1730   Weight: 105.3 kg (232 lb 3.2 oz)      Vital Signs with Ranges  Temp:  [95.3  F (35.2  C)-96.2  F (35.7  C)] 96.1  F (35.6  C)  Pulse:  [56-69] 56  Resp:  [16-18] 18  BP: (106-169)/(58-91) 106/58  SpO2:  [95 %-98 %] 96 %     Constitutional: Alert, oriented to person, place, date, situation.  Cooperative, lying in bed in NAD.   Respiratory:  Lungs CTAB.  No crackles, wheezes, or rhonchi, no labored breathing.  Cardiovascular:  Heart RRR, no MRG, no edema.  Skin/Integumen:  Warm, dry, non-diaphoretic.  MSK:  Moves all 4 extremities equally, limbs atraumatic  Neuro: CN II-XII intact except for right visual field cut, chronic.  Strength normal and extremities except for chronic left leg weakness.  Normal speech, no dysarthria.  No other focal neurological deficits.      Discharge Disposition   Discharged to home  Condition at discharge: Stable    Consultations This Hospital Stay   NEUROLOGY IP CONSULT  OCCUPATIONAL THERAPY ADULT IP CONSULT  PHYSICAL THERAPY ADULT IP CONSULT    Discharge Orders     Reason for your hospital stay   Altered mental status     Follow-up and recommended labs and tests    Follow up with primary care provider, Giovani Segura, within 7 days for hospital follow- up.     Activity   Your activity upon discharge: activity as tolerated     Full Code     Diet   Follow this diet upon discharge: Moderate consistent carbohydrate (7030-5399 jameel / 4-6 CHO units per meal)       Discharge Medications   Discharge Medication List as of 10/5/2018  5:12 PM      START taking these medications    Details   levETIRAcetam  (KEPPRA) 500 MG tablet Take 1 tablet (500 mg) by mouth 2 times daily, Disp-60 tablet, R-0, E-Prescribe         CONTINUE these medications which have NOT CHANGED    Details   apixaban ANTICOAGULANT (ELIQUIS) 5 MG tablet Take 5 mg by mouth 2 times daily, Historical      atorvastatin (LIPITOR) 20 MG tablet Take 20 mg by mouth once a week On Fridays., Historical      brimonidine-timolol (COMBIGAN) 0.2-0.5 % ophthalmic solution Place 1 drop into both eyes 2 times daily, Historical      calcium carbonate 500 mg-vitamin D 200 units (OSCAL WITH D;OYSTER SHELL CALCIUM) 500-200 MG-UNIT per tablet Take 1 tablet by mouth daily (with lunch), Historical      cholecalciferol (VITAMIN D3) 1000 UNIT tablet Take 2,000 Units by mouth daily, Historical      fish oil-omega-3 fatty acids 1000 MG capsule Take 1 g by mouth daily , Historical      !! furosemide (LASIX) 40 MG tablet Take 40 mg by mouth every other day Alternating with 80mg every other day., Historical      !! furosemide (LASIX) 40 MG tablet Take 80 mg by mouth every other day Alternating with 40mg Lasix every other day., Historical      isosorbide mononitrate (IMDUR) 15 mg TABS Take 15 mg by mouth 2 times daily, Historical      loperamide (IMODIUM) 1 MG/5ML liquid Take 3 mg by mouth twice a week Can take with atorvastatin to prevent diarrhea. Takes Friday yong with Lipitor and Tuesdays, Historical      metFORMIN (GLUCOPHAGE) 500 MG tablet Take 500 mg by mouth 3 times daily (with meals), Historical      multivitamin (OCUVITE) TABS tablet Take 1 tablet by mouth 2 times daily, Historical      nitroGLYcerin (NITROSTAT) 0.4 MG sublingual tablet Place 0.4 mg under the tongue every 5 minutes as needed for chest pain For chest pain place 1 tablet under the tongue every 5 minutes for 3 doses. If symptoms persist 5 minutes after 1st dose call 911., Historical      ranibizumab (LUCENTIS) 0.5 MG/0.05ML SOLN 0.5 mg by Intravitreal route every 2 months To the Right Eye., Historical       sotalol (BETAPACE) 80 MG tablet Take 80 mg by mouth 2 times daily, Historical      travoprost, ROSELYN Free, (TRAVATAN Z) 0.004 % ophthalmic solution Place 1 drop into both eyes At Bedtime, Historical      umeclidinium-vilanterol (ANORO ELLIPTA) 62.5-25 MCG/INH oral inhaler Inhale 1 puff into the lungs daily, Historical       !! - Potential duplicate medications found. Please discuss with provider.        Allergies   Allergies   Allergen Reactions     Codeine Other (See Comments)     Gets sicker on the medication      Oxycodone-Acetaminophen Nausea     nausea     Data   Most Recent 3 CBC's:  Recent Labs   Lab Test  10/05/18   0635   WBC  6.1   HGB  11.0*   MCV  94   PLT  176      Most Recent 3 BMP's:  Recent Labs   Lab Test  10/05/18   0635   NA  142   POTASSIUM  3.8   CHLORIDE  106   CO2  31   BUN  15   CR  0.80   ANIONGAP  5   DONNA  8.5   GLC  103*     Most Recent 2 LFT's:  Recent Labs   Lab Test  10/04/18   2015   AST  18   ALT  13   ALKPHOS  49   BILITOTAL  0.6     Most Recent INR's and Anticoagulation Dosing History:  Anticoagulation Dose History     There is no flowsheet data to display.        Most Recent 3 Troponin's:  Recent Labs   Lab Test  10/05/18   0255  10/04/18   2015   TROPI  <0.015  <0.015     Most Recent Cholesterol Panel:  Recent Labs   Lab Test  10/04/18   2015   CHOL  137   LDL  67   HDL  45   TRIG  125     Most Recent 6 Bacteria Isolates From Any Culture (See EPIC Reports for Culture Details):No lab results found.  Most Recent TSH, T4 and A1c Labs:  Recent Labs   Lab Test  10/04/18   2015   A1C  6.6*     Results for orders placed or performed during the hospital encounter of 10/04/18   CT Head w/o Contrast    Narrative    CT SCAN OF THE HEAD WITHOUT CONTRAST  10/5/2018 1:23 PM     HISTORY:  Aphasia.    TECHNIQUE:  Axial images of the head and coronal reformations without  IV contrast material. Radiation dose for this scan was reduced using  automated exposure control, adjustment of the mA and/or  kV according  to patient size, or iterative reconstruction technique.    COMPARISON: None.    FINDINGS: Area of well-defined encephalomalacia within the left  occipital lobe probably due to chronic infarct. No evidence of acute  intracranial hemorrhage. No mass effect or midline shift. Ventricular  size is within normal limits without evidence of hydrocephalus. Mild  diffuse parenchymal volume loss. Nonspecific periventricular white  matter changes are likely due to chronic microvascular ischemic  disease.    The visualized portions of the sinuses and mastoids appear normal. The  bony calvarium and bones of the skull base appear intact.       Impression    IMPRESSION:     1. No evidence of acute intracranial hemorrhage, mass, or herniation.  2. Well-defined area of encephalomalacia within the inferior left  occipital lobe likely due to previous infarct.  3. There is generalized atrophy of the brain. White matter changes are  present in the cerebral hemispheres that are consistent with small  vessel ischemic disease in this age patient.     REJI ALVARENGA MD

## 2018-10-12 ENCOUNTER — RECORDS - HEALTHEAST (OUTPATIENT)
Dept: LAB | Facility: CLINIC | Age: 83
End: 2018-10-12

## 2018-10-12 LAB — LEVETIRACETAM (KEPPRA): 25.5 UG/ML (ref 6–46)

## 2018-10-23 ENCOUNTER — AMBULATORY - HEALTHEAST (OUTPATIENT)
Dept: CARDIOLOGY | Facility: CLINIC | Age: 83
End: 2018-10-23

## 2018-10-23 DIAGNOSIS — Z95.0 PACEMAKER: ICD-10-CM

## 2018-10-23 NOTE — PROGRESS NOTES
[facility]      OUTPATIENT OCCUPATIONAL THERAPY  EVALUATION  PLAN OF TREATMENT FOR OUTPATIENT REHABILITATION  (COMPLETE FOR INITIAL CLAIMS ONLY)  Patient's Last Name, First Name, M.I.  YOB: 1926  Hola Garcia                             Provider's Name  [facility] Medical Record No.  [id number]                               Onset Date:  10/04/18   Start of Care Date:  10/05/18     Type:     ___PT   _X_OT   ___SLP Medical Diagnosis:  TIA                        OT Diagnosis:  decreased ADL/IADL performance   Visits from SOC:  1   _________________________________________________________________________________  Plan of Treatment/Functional Goals    Planned Interventions: ADL retraining, IADL retraining, cognition,       Goals: See Occupational Therapy Goals on Care Plan in [employer] electronic health record.    Therapy Frequency:    Predicted Duration of Therapy Intervention: eval and one treatment only on OBS patient  _________________________________________________________________________________    I CERTIFY THE NEED FOR THESE SERVICES FURNISHED UNDER        THIS PLAN OF TREATMENT AND WHILE UNDER MY CARE     (Physician co-signature of this document indicates review and certification of the therapy plan).                Certification date from: 10/05/18, Certification date to: 10/05/18    Referring Physician: Dr. Jose Antonio Hidalgo            Initial Assessment        See Occupational Therapy evaluation dated 10/05/18 in Epic electronic health record.

## 2018-10-26 ENCOUNTER — RECORDS - HEALTHEAST (OUTPATIENT)
Dept: LAB | Facility: CLINIC | Age: 83
End: 2018-10-26

## 2018-10-26 LAB — LEVETIRACETAM (KEPPRA): 40.6 UG/ML (ref 6–46)

## 2018-10-29 ENCOUNTER — AMBULATORY - HEALTHEAST (OUTPATIENT)
Dept: CARDIOLOGY | Facility: CLINIC | Age: 83
End: 2018-10-29

## 2018-10-29 DIAGNOSIS — Z95.0 PRESENCE OF PERMANENT CARDIAC PACEMAKER: ICD-10-CM

## 2018-11-27 ENCOUNTER — AMBULATORY - HEALTHEAST (OUTPATIENT)
Dept: CARDIOLOGY | Facility: CLINIC | Age: 83
End: 2018-11-27

## 2018-11-27 ENCOUNTER — RECORDS - HEALTHEAST (OUTPATIENT)
Dept: ADMINISTRATIVE | Facility: OTHER | Age: 83
End: 2018-11-27

## 2019-01-01 ENCOUNTER — AMBULATORY - HEALTHEAST (OUTPATIENT)
Dept: CARDIOLOGY | Facility: CLINIC | Age: 84
End: 2019-01-01

## 2019-01-01 ENCOUNTER — OFFICE VISIT - HEALTHEAST (OUTPATIENT)
Dept: CARDIOLOGY | Facility: CLINIC | Age: 84
End: 2019-01-01

## 2019-01-01 ENCOUNTER — COMMUNICATION - HEALTHEAST (OUTPATIENT)
Dept: CARDIOLOGY | Facility: CLINIC | Age: 84
End: 2019-01-01

## 2019-01-01 ENCOUNTER — RECORDS - HEALTHEAST (OUTPATIENT)
Dept: LAB | Facility: CLINIC | Age: 84
End: 2019-01-01

## 2019-01-01 ENCOUNTER — OFFICE VISIT - HEALTHEAST (OUTPATIENT)
Dept: PULMONOLOGY | Facility: OTHER | Age: 84
End: 2019-01-01

## 2019-01-01 DIAGNOSIS — R09.81 NASAL CONGESTION: ICD-10-CM

## 2019-01-01 DIAGNOSIS — Z95.0 PRESENCE OF PERMANENT CARDIAC PACEMAKER: ICD-10-CM

## 2019-01-01 DIAGNOSIS — J96.11 CHRONIC RESPIRATORY FAILURE WITH HYPOXIA (H): ICD-10-CM

## 2019-01-01 DIAGNOSIS — I10 ESSENTIAL HYPERTENSION: ICD-10-CM

## 2019-01-01 DIAGNOSIS — I25.10 CAD (CORONARY ARTERY DISEASE): ICD-10-CM

## 2019-01-01 DIAGNOSIS — I48.19 PERSISTENT ATRIAL FIBRILLATION (H): ICD-10-CM

## 2019-01-01 DIAGNOSIS — R09.82 POST-NASAL DRIP: ICD-10-CM

## 2019-01-01 DIAGNOSIS — I27.23 PULMONARY HYPERTENSION DUE TO LUNG DISEASES AND HYPOXIA (H): ICD-10-CM

## 2019-01-01 DIAGNOSIS — I48.91 A-FIB (H): ICD-10-CM

## 2019-01-01 DIAGNOSIS — Z95.0 PACEMAKER: ICD-10-CM

## 2019-01-01 LAB
ALBUMIN SERPL-MCNC: 3.9 G/DL (ref 3.5–5)
ALP SERPL-CCNC: 51 U/L (ref 45–120)
ALT SERPL W P-5'-P-CCNC: 9 U/L (ref 0–45)
ANION GAP SERPL CALCULATED.3IONS-SCNC: 11 MMOL/L (ref 5–18)
ANION GAP SERPL CALCULATED.3IONS-SCNC: 11 MMOL/L (ref 5–18)
AST SERPL W P-5'-P-CCNC: 15 U/L (ref 0–40)
ATRIAL RATE - MUSE: 55 BPM
BILIRUB SERPL-MCNC: 0.6 MG/DL (ref 0–1)
BUN SERPL-MCNC: 14 MG/DL (ref 8–28)
BUN SERPL-MCNC: 19 MG/DL (ref 8–28)
CALCIUM SERPL-MCNC: 9.7 MG/DL (ref 8.5–10.5)
CALCIUM SERPL-MCNC: 9.7 MG/DL (ref 8.5–10.5)
CHLORIDE BLD-SCNC: 95 MMOL/L (ref 98–107)
CHLORIDE BLD-SCNC: 98 MMOL/L (ref 98–107)
CHOLEST SERPL-MCNC: 148 MG/DL
CO2 SERPL-SCNC: 30 MMOL/L (ref 22–31)
CO2 SERPL-SCNC: 31 MMOL/L (ref 22–31)
CREAT SERPL-MCNC: 0.87 MG/DL (ref 0.7–1.3)
CREAT SERPL-MCNC: 1.07 MG/DL (ref 0.7–1.3)
CREAT UR-MCNC: 132.2 MG/DL
DIASTOLIC BLOOD PRESSURE - MUSE: NORMAL MMHG
FASTING STATUS PATIENT QL REPORTED: NO
GFR SERPL CREATININE-BSD FRML MDRD: >60 ML/MIN/1.73M2
GFR SERPL CREATININE-BSD FRML MDRD: >60 ML/MIN/1.73M2
GLUCOSE BLD-MCNC: 160 MG/DL (ref 70–125)
GLUCOSE BLD-MCNC: 83 MG/DL (ref 70–125)
HCC DEVICE COMMENTS: NORMAL
HCC DEVICE IMPLANTING PROVIDER: NORMAL
HCC DEVICE MANUFACTURE: NORMAL
HCC DEVICE MODEL: NORMAL
HCC DEVICE SERIAL NUMBER: NORMAL
HCC DEVICE TYPE: NORMAL
HDLC SERPL-MCNC: 48 MG/DL
INTERPRETATION ECG - MUSE: NORMAL
LDLC SERPL CALC-MCNC: 73 MG/DL
MAGNESIUM SERPL-MCNC: 1.9 MG/DL (ref 1.8–2.6)
MICROALBUMIN UR-MCNC: 4.04 MG/DL (ref 0–1.99)
MICROALBUMIN/CREAT UR: 30.6 MG/G
P AXIS - MUSE: 27 DEGREES
POTASSIUM BLD-SCNC: 3.7 MMOL/L (ref 3.5–5)
POTASSIUM BLD-SCNC: 4.6 MMOL/L (ref 3.5–5)
PR INTERVAL - MUSE: 224 MS
PROT SERPL-MCNC: 6.8 G/DL (ref 6–8)
QRS DURATION - MUSE: 88 MS
QT - MUSE: 470 MS
QTC - MUSE: 449 MS
R AXIS - MUSE: 114 DEGREES
SODIUM SERPL-SCNC: 137 MMOL/L (ref 136–145)
SODIUM SERPL-SCNC: 139 MMOL/L (ref 136–145)
SYSTOLIC BLOOD PRESSURE - MUSE: NORMAL MMHG
T AXIS - MUSE: -52 DEGREES
TRIGL SERPL-MCNC: 136 MG/DL
VENTRICULAR RATE- MUSE: 55 BPM

## 2019-01-01 ASSESSMENT — MIFFLIN-ST. JEOR
SCORE: 1675.91
SCORE: 1666.84
SCORE: 1648.7

## 2019-02-04 ENCOUNTER — AMBULATORY - HEALTHEAST (OUTPATIENT)
Dept: CARDIOLOGY | Facility: CLINIC | Age: 84
End: 2019-02-04

## 2019-02-04 DIAGNOSIS — Z95.0 PRESENCE OF PERMANENT CARDIAC PACEMAKER: ICD-10-CM

## 2019-02-12 ENCOUNTER — COMMUNICATION - HEALTHEAST (OUTPATIENT)
Dept: ADMINISTRATIVE | Facility: CLINIC | Age: 84
End: 2019-02-12

## 2019-02-21 ENCOUNTER — COMMUNICATION - HEALTHEAST (OUTPATIENT)
Dept: CARDIOLOGY | Facility: CLINIC | Age: 84
End: 2019-02-21

## 2019-02-21 DIAGNOSIS — I48.19 PERSISTENT ATRIAL FIBRILLATION (H): ICD-10-CM

## 2020-01-01 ENCOUNTER — COMMUNICATION - HEALTHEAST (OUTPATIENT)
Dept: CARDIOLOGY | Facility: CLINIC | Age: 85
End: 2020-01-01

## 2020-01-01 ENCOUNTER — AMBULATORY - HEALTHEAST (OUTPATIENT)
Dept: CARDIOLOGY | Facility: CLINIC | Age: 85
End: 2020-01-01

## 2020-01-01 ENCOUNTER — OFFICE VISIT - HEALTHEAST (OUTPATIENT)
Dept: OTOLARYNGOLOGY | Facility: CLINIC | Age: 85
End: 2020-01-01

## 2020-01-01 DIAGNOSIS — H61.23 BILATERAL IMPACTED CERUMEN: ICD-10-CM

## 2020-01-01 DIAGNOSIS — R04.0 EPISTAXIS: ICD-10-CM

## 2020-01-01 DIAGNOSIS — I48.19 PERSISTENT ATRIAL FIBRILLATION (H): ICD-10-CM

## 2020-01-01 DIAGNOSIS — Z95.0 PACEMAKER: ICD-10-CM

## 2020-01-01 DIAGNOSIS — I49.5 SICK SINUS SYNDROME (H): ICD-10-CM

## 2021-05-27 NOTE — PATIENT INSTRUCTIONS - HE
Hola,    It was a pleasure to see you today at the [facility].     You need to see the lung doctor about your low oxygen levels and lung troubles. You will see Dr. Royal again only if they recommend it.    Please call us if you have any questions or concerns about your heart.      Andriy Royal M.D.

## 2021-05-29 NOTE — PROGRESS NOTES
Pulmonary Clinic Outpatient Consultation    Assessment and Plan:   89 yo M with a history of DM, prior CVA, HLD, persistent atrial fibrillation on sotalol and eliquis, chronic respiratory failure on oxygen, who presents for an evaluation of severe pulmonary hypertension with prior RVSP of 85 mmHg.     History and evaluation thus far notable for mild interstitial lung disease on prior CT from 2014, out of proportion to his O2 needs. This scan also shows mosaic attenuation which can be seen in airways disease, or pulmonary vascular disease. No significant left sided cardiac etiologies on echo, and no intracardiac shunting. The ILD etiology is unknown at this point - he did have asbestos exposure, but this was for a very short period of time.    I discussed with him the following, typical approach of updating a high resolution CT scan as a suspect these changes may have progressed, pursuing V/Q scan to evaluate for chronic thromboembolic disease, sleep study, basic lab work up to work up the ILD/any connective tissue diseases, and possible right heart catheterization pending these results.     He appropriately raised the question of the benefit of this much testing given his age. He told me that he does not want any further invasive testing at this point in his life, and wants to minimize medications, and only focus on interventions that will result in improvement in his quality of life. He does not want to use CPAP, so I would not pursue a PSG. If we showed worsening ILD on a scan, this would also be unlikely to change his management. Diagnosis of CTEPH via V/Q may mean we change his anticoagulation to coumadin and consider riocoguat, though any PH therapies could worsen his hypoxemia from chronic lung disease.     We discussed all of this in detail. At this time, he does not want any additional testing. He requested any recommendation for treatment of his psot nasal drip and nasal congestion.    PLAN:    Trial of  flonase of congestion and PND    Stop anoro, as there is no evidence for a diagnosis of COPD - he will let me know if symptoms worsen off of it    Will hold off on further PH testing after our discussion. If he wanted to do anything, would update a high resolution CT chest and a V/Q scan.       He will let me know if he wants any further PH testing. I will see him back as needed.        Joie Alegria MD  Pulmonary and Critical Care Medicine  [facility]  Office: [phone]  Pager: [phone]    ----------------------    Reason for Consult: Pulmonary hypertension    HPI:   89 yo M with a history of DM, prior CVA, HLD, persistent atrial fibrillation on sotalol and eliquis, chronic respiratory failure on oxygen, who presents for an evaluation of pulmonary hypertension. Last echo had an RVSP of 85 mmHg.     Today he reports that his weights have been stable. He feels his exertional shortness of breath is mild and improved by the 2-3 LPM oxygen. He feels most symptomatic when he is in afib. He is on anoro, which was prescribed when he was given a diagnosis of COPD in the past.      Pulmonary Hypertension Evaluation:  Group I - PAH:  Familial (BMPR2 gene mutation): No known family history  Connective tissue disease: No known personal, family history. Asymptomatic  Portopulmonary: Liver disease/LFTs: None known, normal LFTs, no alcohol abuse  Weight loss medications: Never  Drugs: cocaine, meth: Never  HIV: reports prior screening negative  TSH: normal in 2018  Travel (schistosomiasis): No recent travel in last 10 years  Medication-induced: interferon, dasatinib: No  Congenital heart disease with L to R shunt (ASD, VSD, PDA) - None on echo    Group II - pulmonary venous HTN  Left heart disease, valvular diseases  Echo results: EF 55-60%, RV moderately dilated with reduced function, RVSP 85 mmHg, negative for intracardiac shunt, trace MR and trace AR    Group III - chronic lung  disease  Hypoxemic-vasoconstriction:  CT scan: 2014 - subpleural reticular ILD in bilateral upper-lower lobes. Diffuse mosaicism  PFTs: Moderate restriction with diffusion impairment   Sleep study: No prior PSG, [place] [facility]  Hypoxemia: On oxygen for 2 years    Group IV - chronic thromboembolic disease (CTEPH)  V/Q scan: None  Personal/FH of VTE: No    Group V - Miscellaneous   PLCH, sarcoid, chronic hemolysis, Gaucher's, heme d/o: No      ROS:  A 12-system review was obtained and was negative with the exception of the symptoms endorsed in the history of present illness.    PMH:  Past Medical History:   Diagnosis Date     Anemia 12/25/2014    cause not specified in medical records     CAP (community acquired pneumonia) 12/30/2016     CVA (cerebral vascular accident) (H) 10/22/2018     Disc disorder 04/30/2014    s/p lumbar disk surgery  4/30/2014 recent increase L leg pain but MRI showed nothing to operate on.       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 Dobson for sick sinus syndrome     BACK SURGERY       EYE SURGERY       TONSILLECTOMY       Allergies:  Allergies   Allergen Reactions     Codeine Other (See Comments)     Gets sicker on the medication      Percocet [Oxycodone-Acetaminophen] Nausea Only     nausea     Family HX:  Family History   Problem Relation Age of Onset     Glaucoma Mother      Other Father          Hola did not have contact with his father as his parents      Social Hx:  Social History     Socioeconomic History     Marital status:      Spouse name: Oliverio Garcia     Number of children: 0     Years of education: Not on file     Highest education level: Not on file   Occupational History     Occupation: teaching, 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     Employer: RETIRED   Social Needs     Financial resource strain: Not on file     Food insecurity:     Worry: Not on file     Inability: Not on file     Transportation needs:     Medical: Not on file     Non-medical: Not on file   Tobacco Use     Smoking status: Former Smoker     Types: Pipe     Last attempt to quit: 1974     Years since quittin.4     Smokeless tobacco: Never Used   Substance and Sexual Activity     Alcohol use: No     Alcohol/week: 0.0 oz     Drug use: No     Sexual activity: Yes     Partners: Female   Lifestyle     Physical activity:     Days per week: Not on file     Minutes per session: Not on file     Stress: Not on file   Relationships     Social connections:     Talks on phone: Not on file     Gets together: Not on file     Attends [employer] service: Not on file     Active member of club or organization: Not on file     Attends meetings of clubs or organizations: Not on file     Relationship status: Not on file     Intimate partner violence:     Fear of current or ex partner: Not on file     Emotionally abused: Not on file     Physically abused: Not on file     Forced sexual activity: Not on file   Other Topics Concern     Not on file   Social History Narrative    His wife, Oliverio, has heart failure and COPD. They have two adopted children, Tanisha and Dain. 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    Physical Exam:  /72   Pulse 96   Resp 24   Ht 6' (1.829 m)   Wt 218 lb (98.9 kg) Comment: per pt  SpO2 97% Comment: 3L pulsing  BMI 29.57 kg/m    Gen: Alert, oriented, no distress  HEENT: nasal turbinates are unremarkable, no oropharyngeal lesions, no cervical or supraclavicular lymphadenopathy  CV: Irreg/irreg  Resp: Bibasilar crackles  Abd: soft, nontender, no palpable organomegaly  Skin: no apparent rashes  Ext: no cyanosis, clubbing or edema  Neuro: alert, nonfocal    Labs:  Reviewed    Imaging studies:  Personally reviewed:    12/26/14 CT PE:  ANGIOGRAM CHEST: Negative for pulmonary emboli. Negative for thoracic aortic dissection and aneurysms.     LUNGS AND PLEURA: There is some reticular interstitial disease mainly in the upper lobes.     MEDIASTINUM: Normal.     LIMITED UPPER ABDOMEN: Negative.     MUSCULOSKELETAL: Negative.     CONCLUSION:  1.  No sign of pulmonary emboli.    7/30/18 PFT's  FEV1/FVC is 90 and is normal.  FEV1 is 74% predicted and is reduced.  FVC is 59% predicted and reduced.  There was no improvement in spirometry after a single inhaled dose of bronchodilator.  TLC is 53% predicted and is reduced.  RV is 55% predicted and is reduced.  DLCO is 64% predicted and is reduced when it   is corrected for hemoglobin.     Impression:  Full Pulmonary Function Test is abnormal.    PFTs are consistent with moderate restrictive disease.  Diffusion capacity when corrected for hemoglobin is mildly reduced.    10/5/18 Echo with Bubble    The visual ejection fraction is estimated at 55-60%.  The right ventricle is moderately dilated.  Moderately decreased  right ventricular systolic function  There is no atrial shunt seen.  A contrast injection (Bubble Study) was performed that was negative for flowacross the interatrial septum.  Severe (>55mmHg) pulmonary hypertension is present.  The ascending aorta is Mildly dilated.  The rhythm was sinus bradycardia.  There is no comparison study available.

## 2021-05-29 NOTE — PATIENT INSTRUCTIONS - HE
It was a pleasure to see you today.    I think the pulmonary hypertension is due to your chronic lung scarring. The treatment for this is oxygen. We discussed not pursuing any additional invasive testing testing     Try stopping the anoro inhaler - if your breathing doesn't worsen, you can stop this entirely.    I sent a nasal spray to your pharmacy called flonase, this may help with the nasal congestion. You can continue to use the saline spray as well.          Joie Alegria MD  Pulmonary and Critical Care Medicine  [facility]  Office: [phone]  Pager: [phone]

## 2021-05-30 VITALS — BODY MASS INDEX: 34.4 KG/M2 | WEIGHT: 254 LBS | HEIGHT: 72 IN

## 2021-05-30 VITALS — WEIGHT: 254 LBS | HEIGHT: 72 IN | BODY MASS INDEX: 34.4 KG/M2

## 2021-05-31 VITALS — HEIGHT: 72 IN | BODY MASS INDEX: 34.4 KG/M2 | WEIGHT: 254 LBS

## 2021-05-31 NOTE — PROGRESS NOTES
[facility] Progress Note    Assessment:  1.  Pulmonary hypertension WHO group 3.  Chronically on oxygen.  I appreciate input from both Dr. Royal and Dr. Alegria.  He is quite clear that he does not wish additional evaluation.  In addition we talked about follow-up echocardiogram which at this time he would choose not to pursue.    2.  Paroxysmal atrial fibrillation.  He has been on sotalol with creatinine 1.0 7 April 2019.  He should have periodic monitoring of his electrolytes.  EKG today demonstrates QT to be improved from previous.  Continued close monitoring and pacemaker observation.  We did talk about the potential for AV node ablation as well as watchman device.  He indicated that he would take this under advisement but appears not interested in making much in the way of changes.    3.  History of apparent seizure followed by neurology on [facility].      Plan: Follow-up 6 months    1. A-fib (H)  ECG Clinic - Today   2. Essential hypertension     3. Persistent atrial fibrillation (H)     4. Presence of permanent cardiac pacemaker--- dual chamber [place]     5. 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    Subjective:    Hola Garcia is a 89 y.o. male who returned for a planned  follow up visit.  I have reviewed notes both by Dr. Royal and Dr. Alegria.  He has a history of paroxysmal atrial fibrillation and had been placed on sotalol when he saw my colleagues in atrial fibrillation clinic in 2018.  I have not seen him for some time.  He tells me that overall he is been feeling well.  He does not get short of breath at rest but does get breathless with activity.  He can walk short distances without shortness of breath.  He does not describe orthopnea or PND.  He keeps excellent records and his weights have been under good control but does note periods where his heart rate is faster typically in the low  100 range and at these times he has a little bit more breathlessness with activity.  This is relatively mild.  He has been very clear that he does not wish additional evaluation and reviewed this with Dr. Alegria recently regarding further work-up of pulmonary hypertension.  He tells me he is been steady on his feet and is comfortable with the current anticoagulation strategy.  We discussed the risks of bleeding and the risks of falling and the importance of updating me.  Pacemaker check last month demonstrated a 26% burden of atrial fibrillation with the longest being 41 days.  Reportedly reviewing the A. fib clinic notes before sotalol he was having 70% burden of atrial fibrillation.    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, WHO group 3     Anemia     Dyslipidemia, goal LDL below 70     DM2 (diabetes mellitus, type 2) (H)     Nonintractable epilepsy without status epilepticus, unspecified epilepsy type (H)       Social History     Socioeconomic History     Marital status:      Spouse name: Oliverio Garcia     Number of children: 0     Years of education: Not on file     Highest education level: Not on file   Occupational History     Occupation: teaching, 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     Employer: RETIRED   Social Needs     Financial resource strain: Not on file     Food insecurity:     Worry: Not on file     Inability: Not on file     Transportation needs:     Medical: Not on  file     Non-medical: Not on file   Tobacco Use     Smoking status: Former Smoker     Types: Pipe     Last attempt to quit: 1974     Years since quittin.6     Smokeless tobacco: Never Used   Substance and Sexual Activity     Alcohol use: No     Alcohol/week: 0.0 oz     Drug use: No     Sexual activity: Yes     Partners: Female   Lifestyle     Physical activity:     Days per week: Not on file     Minutes per session: Not on file     Stress: Not on file   Relationships     Social connections:     Talks on phone: Not on file     Gets together: Not on file     Attends Yazidism service: Not on file     Active member of club or organization: Not on file     Attends meetings of clubs or organizations: Not on file     Relationship status: Not on file     Intimate partner violence:     Fear of current or ex partner: Not on file     Emotionally abused: Not on file     Physically abused: Not on file     Forced sexual activity: Not on file   Other Topics Concern     Not on file   Social History Narrative    His wife, Oliverio, has heart failure and COPD. They have two adopted children, Tanisha and Dain. 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       Family History   Problem Relation Age of Onset     Glaucoma Mother      Other Father         Hola did not have contact with his father as his parents        Current Outpatient Medications   Medication Sig Dispense Refill     atorvastatin (LIPITOR) 20 MG tablet Take 20 mg by mouth once a week. On        brimonidine-timolol (COMBIGAN) 0.2-0.5 % ophthalmic solution Administer 1 drop to both eyes 2 (two) times a day.       calcium-vitamin D (CALCIUM-VITAMIN D) 500 mg(1,250mg) -200 unit per tablet Take 1 tablet by mouth daily with lunch.       cholecalciferol, vitamin D3, 2,000 unit cap Take 2,000 Units by mouth daily.       ELIQUIS 5 mg Tab tablet TAKE ONE TABLET BY MOUTH  EVERY 12 HOURS 180 tablet 2     furosemide (LASIX) 40 MG tablet Take 40 mg by mouth every other day. Alternate 40 mg and 80 mg every other day.       furosemide (LASIX) 40 MG tablet Take 80 mg by mouth every other day. Alternate 40 mg and 80 mg every other day.       isosorbide mononitrate (IMDUR) 30 MG 24 hr tablet TAKE ONE-HALF TABLET BY MOUTH TWO TIMES A DAY 90 tablet 1     levETIRAcetam (KEPPRA) 500 MG tablet Take 1.5 tablets (750 mg total) by mouth 2 (two) times a day. 30 tablet 1     loperamide (IMODIUM) 1 mg/5 mL solution Take 2 mg by mouth 4 (four) times a day as needed. Can take with Atorvastatin to prevent diarrhea.       metFORMIN (GLUCOPHAGE) 500 MG tablet Take 500 mg by mouth 3 (three) times a day with meals.       nitroglycerin (NITROSTAT) 0.4 MG SL tablet Place 0.4 mg under the tongue every 5 (five) minutes as needed for chest pain.       omega-3 fatty acids-vitamin E (FISH OIL) 1,000 mg cap Take 1,000 mg by mouth daily.       OXYGEN-AIR DELIVERY SYSTEMS MISC 2 L into each nostril continuous. 2Lpulse resting and 3Lpulse with activity and 3LPM with sleep  [facility]             ranibizumab (LUCENTIS) 0.5 mg/0.05 mL injection Administer 0.5 mg to the right eye every 2 (two) months.        sotalol (BETAPACE) 80 MG tablet TAKE ONE TABLET BY MOUTH TWICE A  tablet 1     travoprost (TRAVATAN Z) 0.004 % Drop ophthalmic drops Administer 1 drop to both eyes bedtime.       vit A,C and E-lutein-minerals (OCUVITE WITH LUTEIN) 1,000 unit-200 mg-60 unit-2 mg Tab Take 1 tablet by mouth daily.       vitamin A-vitamin C-vit E-min (OCUVITE) Tab tablet Take 1 tablet by mouth 2 (two) times a day.       umeclidinium-vilanterol (ANORO ELLIPTA) 62.5-25 mcg/actuation inhaler Inhale 1 puff daily.       No current facility-administered medications for this visit.        Objective:     /58 (Patient Site: Right Arm, Patient Position: Sitting, Cuff Size: Adult Large)   Pulse (!) 51   Resp 22   Ht 6' (1.829 m)   Wt 214  lb (97.1 kg) Comment: Per. pt.  BMI 29.02 kg/m    214 lb (97.1 kg)       10/24/18 (!) 225 lb 3.2 oz (102.2 kg)   10/22/18 (!) 239 lb (108.4 kg)   18 (!) 230 lb (104.3 kg)   08/10/18 (!) 229 lb 8 oz (104.1 kg)                                 Physical Exam:    GENERAL APPEARANCE: alert, no apparent distress  HEENT: no scleral icterus or xanthelasma  NECK: jugular venous pressure appears elevated but he is examined in the wheelchair making it somewhat challenging.  CHEST: symmetric, the lungs are clear to auscultation  CARDIOVASCULAR: regular rhythm with movement and second heart sound 2/6 systolic murmur.  Pacemaker site well-healed.  Abdomen: No Organomegaly, masses, bruits, or tenderness. Bowels sounds are present      EXTREMITIES: no cyanosis, clubbing, mild edema    Cardiac Testing:  Atrial paced, ST-T changes with QTC corrected of 449 ms which is shorter than prior EKGs.  Reading physician: Darya Arzate MD Ordering physician: Dave Honeycutt MD Study date: 18   Performing Date Performing Department   2018 [facility] CARDIAC TESTING [[id number]]   Patient Information     Patient Name  Hola Garcia MRN  [id number] Sex  Male              Age  1926 (89 y.o.)   Indications     Dx: CHF (congestive heart failure) (H) [I50.9 (ICD-10-CM)]   Summary       When compared to the previous study dated 3/8/2017, Pulmonary hypertension has worsened and there is increased right ventricular dilatation and dysfunction.    Left ventricle ejection fraction is normal. The calculated left ventricular ejection fraction is 56%.    Normal left ventricular size and systolic function.    Systolic and diastolic flattening of the interventricular septum consistent with right ventricle pressure and volume overload.    Right Ventricle: The right ventricle is moderately dilated. The systolic function is moderately reduced. Pacer lead is present in the ventricle.    Left Atrium: Left atrial volume is moderately  increased.    The aortic valve is sclerotic without reduced excursion.    Severe pulmonary hypertension present.          Lab Results:    Lab Results   Component Value Date     04/01/2019    K 4.6 04/01/2019    CL 98 04/01/2019    CO2 30 04/01/2019    BUN 19 04/01/2019    CREATININE 1.07 04/01/2019    CALCIUM 9.7 04/01/2019     Lab Results   Component Value Date    CHOL 148 04/01/2019    TRIG 136 04/01/2019    HDL 48 04/01/2019     BNP (pg/mL)   Date Value   08/10/2018 394 (H)   07/12/2018 352 (H)   04/18/2018 579 (H)     Creatinine (mg/dL)   Date Value   04/01/2019 1.07   10/22/2018 0.90   10/04/2018 0.82   08/11/2018 0.85     LDL Calculated (mg/dL)   Date Value   04/01/2019 73   10/23/2018 63   12/04/2017 61     Lab Results   Component Value Date    WBC 8.2 10/22/2018    WBC 10.3 09/29/2015    HGB 11.9 (L) 10/22/2018    HCT 36.1 (L) 10/22/2018    MCV 93 10/22/2018     10/22/2018               This note has been dictated using voice recognition software. Any grammatical or context distortions are unintentional and inherent to the software.      Dave Honeycutt M.D., F.A.C.C.  [facility]  [phone]

## 2021-05-31 NOTE — PATIENT INSTRUCTIONS - HE
Nice to see you today.We talked about a procedure call AV node ablation which would allow better control of your heart rate but would require a catheter procedure and an upgrade of your pacemaker.Please call me if you wish to discuss further.My nurse is Laura and her number is [phone]

## 2021-06-01 VITALS — WEIGHT: 245 LBS | HEIGHT: 72 IN | BODY MASS INDEX: 33.18 KG/M2

## 2021-06-01 VITALS — BODY MASS INDEX: 33.29 KG/M2 | HEIGHT: 72 IN | WEIGHT: 245.8 LBS

## 2021-06-01 VITALS — WEIGHT: 249 LBS | HEIGHT: 73 IN | BODY MASS INDEX: 33 KG/M2

## 2021-06-01 VITALS — WEIGHT: 230 LBS | BODY MASS INDEX: 31.19 KG/M2

## 2021-06-01 VITALS — HEIGHT: 73 IN | BODY MASS INDEX: 33 KG/M2 | WEIGHT: 249 LBS

## 2021-06-01 VITALS — BODY MASS INDEX: 31.83 KG/M2 | WEIGHT: 235 LBS | HEIGHT: 72 IN

## 2021-06-01 VITALS — HEIGHT: 72 IN | WEIGHT: 228 LBS | BODY MASS INDEX: 30.88 KG/M2

## 2021-06-01 VITALS — BODY MASS INDEX: 32.55 KG/M2 | WEIGHT: 240 LBS

## 2021-06-01 VITALS — HEIGHT: 72 IN | WEIGHT: 242 LBS | BODY MASS INDEX: 32.78 KG/M2

## 2021-06-01 NOTE — TELEPHONE ENCOUNTER
"----- Message from Mary Jane Mendoza MD sent at 9/22/2019  1:28 PM CDT -----  Patient of Dr. Dave Honeycutt's who called with heart failure type question.  He is drinking a lot of fluid I asked him to diminish this.  He states his weights up 2 pounds and he just feels may be some palpitations.  I have asked him to keep his diuretic as is, limit fluids and please call him tomorrow and evaluate him.LF        Called patient to get a status update after he called the answering service yesterday. Pt reports he is doing much better and is pleased. His weight is down to 213 lbs from the 215 yesterday without any changes. He had never heard of limiting fluids and will keep it to 6-8 glasses per day or around 50-60 fluid ounces. He states that he was going \"way over\" that roel. He reports he is feeling well and will follow up as previously discussed. -[facility] [place]      "
No cyanosis, clubbing or edema

## 2021-06-02 VITALS — HEIGHT: 72 IN | BODY MASS INDEX: 29.8 KG/M2 | WEIGHT: 220 LBS

## 2021-06-03 VITALS — HEIGHT: 72 IN | WEIGHT: 218 LBS | BODY MASS INDEX: 29.53 KG/M2

## 2021-06-03 VITALS — BODY MASS INDEX: 28.99 KG/M2 | HEIGHT: 72 IN | WEIGHT: 214 LBS

## 2021-06-06 NOTE — PROGRESS NOTES
HISTORY OF PRESENT ILLNESS  Patient comes in for removal of rapid rhino left nose placed for epistaxis. The patient is on Eliquis. No significant history of nosebleeds. No purulent drainage. Since balloon was placed, there hasn't been more bleeding.    REVIEW OF SYSTEMS  Review of Systems: a 10-system review was performed. Pertinent positives are noted in the HPI and on a separate scanned document in the chart.    PMH, PSH, FH and SH has documented in the EHR.      EXAM    CONSTITUTIONAL  General Appearance:  Normal, well developed, well nourished, no obvious distress  Ability to Communicate:  communicates appropriately.    HEAD AND FACE  Appearance and Symmetry:  Normal, no scalp or facial scarring or suspicious lesions.  Paranasal sinuses tenderness:  Normal, Paranasal sinuses non tender    EARS  Clinical speech reception threshold:  Normal, able to hear normal speech.  Auricle:  Normal, Auricles without scars, lesions, masses.  External auditory canal: Bilateral cerumen impaction debrided under otomicroscopy using microdissection technique..  Tympanic membrane:  Normal, Tympanic membranes normal without swelling or erythema.    NOSE (speculum or scope)  Pack removed from the left nose. Suspicious area left anterior septum.   Architecture:  Normal, Grossly normal external nasal architecture with no masses or lesions.  Mucosa:  Normal mucosa, No polyps or masses.  Septum:  Normal, Septum non-obstructing.  Turbinates:  Normal, No turbinate abnormalities    ORAL CAVITY AND OROPHARYNX  Lips:  Normal.  Dental and gingiva:  Normal, No obvious dental or gingival disease.  Mucosa:  Normal, Moist mucous membranes.  Tongue:  Normal, Tongue mobile with no mucosal abnormalities  Hard and soft palate:  Normal, Hard and soft palate without cleft or mucosal lesions.  Oral pharynx:  Normal, Posterior pharynx without lesions or remarkable asymmetry.  Saliva:  Normal, Clear saliva.  Masses:  Normal, No palpable masses or  pathologically enlarged lymph nodes.    NECK  Masses/lymph nodes:  Normal, No worrisome neck masses or lymph nodes.  Salivary glands:  Normal, Parotid and submandibular glands.  Trachea and larynx position:  Normal, Trachea and larynx midline.  Thyroid:  Normal, No thyroid abnormality.  Tenderness:  Normal, No cervical tenderness.  Suppleness:  Normal, Neck supple    NEUROLOGICAL  Speech pattern:  Normal, Proasaic    RESPIRATORY  Symmetry and Respiratory effort:  Normal, Symmetric chest movement and expansion with no increased intercostal retractions or use of accessory muscles.     IMPRESSION  Epistaxis.  Cerumen impaction    RECOMMENDATION    EPISTAXIS TREATMENT  The left nose was sprayed with lidocaine plus epinephrine solution.  After allowing adequate time for anesthesia the area in questions was treated with topical silver nitrate.  The patient tolerated the procedure without difficulty.      Armaan Sandoval MD

## 2021-06-11 NOTE — PROGRESS NOTES
[facility] Progress Note    Assessment:  1.  Diastolic heart failure appears chronic and compensated.  He was admitted to hospital December 2016 and has been on oxygen at night since that time.  He has a history of sleep apnea but intolerant to CPAP and we talked about whether referral to sleep clinic would be beneficial but his preference is to continue with the oxygen at night.  He does have moderate elevated right-sided pressures on an echocardiogram completed a few months ago.  He continues on furosemide with stable laboratory studies February 2017 and was asked that he have follow-up with his primary care physician and periodic laboratory studies.    2.  Atrial fibrillation paroxysmal with a fairly frequent burden of atrial fibrillation based on the pacemaker check today.  Some concern about higher heart rate and will plan to do a Holter monitor to further define.    3.  Hyperlipidemia with an LDL May 2016 of 63        Plan: As outlined above with plan Holter monitor and follow-up in 3-4 months    1. Atrial fibrillation  Holter Monitor   2. Presence of permanent cardiac pacemaker--- dual chamber [place]     3. Chronic diastolic heart failure           An After Visit Summary was printed and given to the patient.    Subjective:    Hola Garcia is a 89 y.o. male who returned for a planned  follow up visit.  He reports that he has been feeling well.  He denies chest pain, significant shortness of breath, orthopnea or PND.  He has a history of sleep apnea intolerant to CPAP but has been utilizing oxygen at night and he has found that this has made a significant difference.  I have not seen him since June 2016.  He was admitted to hospital summer 2016 with shortness of breath with possible pneumonia and diastolic heart failure.  He has a history of paroxysmal atrial fibrillation which appears to have at least a burden of 50% which may be under represented but not aware of tachypalpitations.  His  heart rate however at times is higher on the pacemaker check today.    Review of Systems:   General: WNL  Eyes: WNL  Ears/Nose/Throat: WNL  Lungs: WNL  Heart: WNL  Stomach: WNL  Bladder: WNL  Muscle/Joints: WNL  Skin: WNL  Nervous System: WNL  Mental Health: WNL     Blood: WNL      Problem List:    Patient Active Problem List   Diagnosis     Shortness of breath     Essential tremor     Hyperlipidemia     Essential hypertension     Disc disorder     Type II diabetes mellitus, uncontrolled     Persistent atrial fibrillation     Presence of permanent cardiac pacemaker--- dual chamber [place]     CAP (community acquired pneumonia)     Chronic respiratory failure with hypoxia     Acute diastolic CHF (congestive heart failure)     Diastolic heart failure       Social History     Social History     Marital status:      Spouse name: N/A     Number of children: N/A     Years of education: N/A     Occupational History     Not on file.     Social History Main Topics     Smoking status: Former Smoker     Quit date: 12/26/1974     Smokeless tobacco: Not on file      Comment: Pipe     Alcohol use 0.0 oz/week     0 Shots of liquor per week     Drug use: No     Sexual activity: Yes     Partners: Female     Other Topics Concern     Not on file     Social History Narrative       Family History   Problem Relation Age of Onset     No Medical Problems Mother        Current Outpatient Prescriptions   Medication Sig Dispense Refill     atorvastatin (LIPITOR) 20 MG tablet Take 20 mg by mouth once a week. Take on Tuesdays.       brimonidine-timolol (COMBIGAN) 0.2-0.5 % ophthalmic solution Administer 1 drop to both eyes 2 (two) times a day.       calcium-vitamin D (CALCIUM-VITAMIN D) 500 mg(1,250mg) -200 unit per tablet Take 1 tablet by mouth daily with lunch.       cholecalciferol, vitamin D3, 2,000 unit cap Take 2,000 Units by mouth daily.       diltiazem (CARDIZEM CD) 180 MG 24 hr capsule Take 1 capsule (180 mg total) by mouth  daily. 90 capsule 5     furosemide (LASIX) 40 MG tablet Take 1 tablet (40 mg total) by mouth daily. 30 tablet 0     isosorbide mononitrate (IMDUR) 30 MG 24 hr tablet Take 15 mg by mouth 2 (two) times a day. Half-tablet       metFORMIN (GLUCOPHAGE) 500 MG tablet Take 500 mg by mouth 3 (three) times a day with meals.        nitroglycerin (NITROSTAT) 0.4 MG SL tablet Place 0.4 mg under the tongue every 5 (five) minutes as needed for chest pain.       omega-3 fatty acids-vitamin E (FISH OIL) 1,000 mg cap Take 1,000 mg by mouth daily.       propranolol (INDERAL LA) 160 mg SR capsule Take 160 mg by mouth every evening.        ranibizumab (LUCENTIS) 0.5 mg/0.05 mL injection Administer 0.5 mg to the right eye every 2 (two) months.        travoprost (TRAVATAN Z) 0.004 % Drop ophthalmic drops Administer 1 drop to both eyes bedtime.       vitamin A-vitamin C-vit E-min (OCUVITE) Tab tablet Take 1 tablet by mouth 2 (two) times a day.       warfarin (COUMADIN) 5 MG tablet Take 5 mg by mouth See Admin Instructions. Adjust dose based on INR results as directed.       albuterol (PROVENTIL) 2.5 mg /3 mL (0.083 %) nebulizer solution Take 3 mL (2.5 mg total) by nebulization every 6 (six) hours as needed for wheezing or shortness of breath. 75 mL 0     calcium, as carbonate, (TUMS) 200 mg calcium (500 mg) chewable tablet Chew 1-2 tablets every 6 (six) hours as needed for heartburn.       No current facility-administered medications for this visit.        Objective:     /76 (Patient Site: Right Arm, Patient Position: Sitting, Cuff Size: Adult Large)  Pulse 96 Comment:   Resp 18  Ht 6' (1.829 m)  Wt (!) 254 lb (115.2 kg)  BMI 34.45 kg/m2  (!) 254 lb (115.2 kg)       Physical Exam:    GENERAL APPEARANCE: alert, no apparent distress  HEENT: no scleral icterus or xanthelasma  NECK: jugular venous pressure within normal limits on today's examination  CHEST: symmetric, the lungs are clear to auscultation, pacemaker site  intact  CARDIOVASCULAR: Irregugular rhythm with soft systolic murmur, click, or gallop; no carotid bruits  Abdomen: No Organomegaly, masses, bruits, or tenderness. Bowels sounds are present      EXTREMITIES: no cyanosis, clubbing, mild edema at the ankles    Cardiac Testing:  Echo Complete   Order# [id number]    Reading physician: Pantera Escobar MD   Ordering physician: Darya Arzate MD   Study date: 3/8/17         Patient Information      Patient Name MRN Sex              Age     Hola Garcia [id number] Male 1926 (89 y.o.)       Indications      Congestive heart failure     Dx: Acute diastolic heart failure [I50.31 (ICD-10-CM)]       Summary        When compared to the previous study dated 10/1/2015, no significant change.    Left ventricle ejection fraction is normal. The estimated left ventricular ejection fraction is 55%.    Moderate tricuspid valve regurgitation. Moderate pulmonary hypertension present.    The right ventricle is mildly dilated. The systolic function is mildly reduced     CONCLUSION:  1.  Regadenoson stress.  2.  Prominent right ventricle noted at both stress and rest.  3.  Very small area of mild ischemia involving the distal anteroseptal wall.  No  areas of infarction identified.  4.  Normal ejection fraction without evidence of a regional wall motion abnormality.      Based on perfusion images, the patient is at very low risk for future ischemic events.     BRENT URENA MD  ba  D 2014 14:48:52  T 2014 15:13:34  R 2014 15:13:34        Lab Results:    Lab Results   Component Value Date     2017    K 4.4 2017    CL 97 (L) 2017    CO2 33 (H) 2017    BUN 16 2017    CREATININE 0.85 2017    CALCIUM 9.4 2017     Lab Results   Component Value Date    CHOL 130 2016    TRIG 143 2016    HDL 38 (L) 2016     BNP (pg/mL)   Date Value   2017 296 (H)   2016 468 (H)   2014 203 (H)      Creatinine (mg/dL)   Date Value   02/21/2017 0.85   01/01/2017 0.78   12/31/2016 0.80   12/30/2016 0.72     LDL Calculated (mg/dL)   Date Value   05/19/2016 63   03/19/2015 65   11/25/2014 81     Lab Results   Component Value Date    WBC 7.0 12/31/2016    WBC 10.3 09/29/2015    HGB 11.9 (L) 12/31/2016    HCT 37.8 (L) 12/31/2016    MCV 93 12/31/2016     01/01/2017               This note has been dictated using voice recognition software. Any grammatical or context distortions are unintentional and inherent to the software.      Dave Honeycutt M.D., F.A.C.C.  [facility]  [phone]

## 2021-06-11 NOTE — PROGRESS NOTES
In clinic device check with Device RN. Also seeing Dr. Honeycutt in follow-up today.  Please see link for full device report.  Patient was informed of results and next follow up during today's visit.

## 2021-06-17 NOTE — PROGRESS NOTES
In clinic device check with Device Nurse followed by office visit with Edy Black NP.  Please see link for full device report.  Patient was informed of results and next follow up during today's visit.

## 2021-06-17 NOTE — PROGRESS NOTES
"Patient present to the clinic today for follow up Qt check after recent start of Sotalol. Patient was seen in clinic 4/27/18 by Edy SANTOYO CNP for persistent atrial fibrillation. Patient is not candidate for amiodarone due to lung disease. Patient given instructions to stop diltiazem and propranolol and to start sotalol 80 mg every 12 hours on Monday 5/30/18.  Patient has had 5 doses of sotalol 80 mg prior to EKG. His current sotalol dose is 80 mg 1 tablet every 12 hours since 5/30/18.    Patient states he is doing \"well\" and denies lightheadedness, dizziness or fatigue. He does note weight loss of 2 pounds yesterday and 2 pounds today and will to continue to monitor. His weight has been stable at 242 pounds on 5/1 and is 238 pounds today. He has been taking an increased dose of lasix 40 mg 2 times a day since hospital discharge 4/5/18. Patient has follow up on 6/21/18 with Dr. Royal.    EKG reveals Sinus Rhythm  HR 79   ms  QRS 90 ms  QT/QTc  398/456 ms    Patient was instructed provider will review and if new orders are given a return call will be made. Patient verbalizes understanding and agrees with plan. Patient will follow up as planned. No further questions or concerns.    Edy,  Please review EKG.   Any further recommendations or instructions?  "

## 2021-06-17 NOTE — PROGRESS NOTES
[facility] Progress Note    Assessment:  1.  Diastolic heart failure.  When we last visited this appeared to be compensated.  Recently he was hospitalized with worsening heart failure symptoms and fluid retention.  He is now being more diligent about monitoring his weights which he had stopped for a while and monitoring his salt in his diet.  He will continue monitoring his weights and monitoring his salt in his diet and update us with any changes in his symptoms.      2.  Paroxysmal atrial fibrillation.  He has a fairly high burden of atrial fibrillation and I suspect symptomatic from the episodes of atrial fibrillation with diminished exercise tolerance.  He does not describe dizziness or palpitations.  He has been seen in atrial fibrillation clinic in the past and I have suggested that he return to the atrial fibrillation clinic so that we can talk about strategies to help decrease the burden of atrial fibrillation.  He is not interested in aggressive interventions but would be to consider medication changes.  Remains on warfarin and states that he does well with INR follow-ups.  Currently is on diltiazem at 180 mg daily.  He is also on propranolol which she has been on this combination for some time and has a pacemaker in place.        Plan: Laboratory studies today            Echocardiogram to reevaluate left ventricular function and follow-up on moderate pulmonary hypertension.  Referral to atrial fibrillation and pending echocardiogram results consideration of alternative rhythm control strategies given that I do believe that he is symptomatic from the episodes of atrial fibrillation.    1. CHF (congestive heart failure)  Echo Complete    Basic metabolic panel    BNP(B-type Natriuretic Peptide)    Magnesium   2. A-fib  ECG Clinic - Today         An After Visit Summary was printed and given to the patient.    Subjective:    Hola Garcia is a 89 y.o. male who returned for a planned  follow up  visit.  Patient is overdue to follow-up with me.  We last followed up May 2017.  I reviewed the recent hospitalization where he came in with worsening shortness of breath, fluid retention and dietary salt indiscretion.  He was diuresed and has indicated that he feels better.  Does tell me that he has stopped weighing himself for a while because his scale broke had been eating more salt laden diet which he is changed.  He states that he has been wearing his oxygen on a daily basis.  He has noted over the last 6-12 months some diminished exercise tolerance and that he does get a little bit more breathless when he exerts himself.  He apparently has an oximeter and checks his oxygen saturations and had noted in the past that his oxygen saturations at times were in the high 80s.  More recently he reports that his oxygen saturations are in the 90s with utilization of his oxygen.  He is not aware of palpitations but notes on his oximeter that when his heart rate is in the 60s he has less shortness of breath and limits in the 80s-90s and presuming atrial fibrillation he is a little bit more breathless with activity.  He is not describing chest pain, orthopnea or PND.  His last device check does demonstrate significant burden of atrial fibrillation at 68%.    Historically he has been of the opinion that he wished medical management and did not wish to do any aggressive intervention.  He continues to feel this way.  However we had discussion about potential options to try to decrease the burden of atrial fibrillation which may contribute to his sense of well-being.    Review of Systems:   General: WNL  Eyes: WNL  Ears/Nose/Throat: WNL  Lungs: WNL  Heart: Shortness of Breath with activity  Stomach: WNL  Bladder: WNL  Muscle/Joints: WNL  Skin: WNL  Nervous System: WNL  Mental Health: WNL     Blood: WNL      Problem List:    Patient Active Problem List   Diagnosis     Shortness of breath     Essential tremor     Hyperlipidemia      Essential hypertension     Disc disorder     Type II diabetes mellitus, uncontrolled     Persistent atrial fibrillation     Presence of permanent cardiac pacemaker--- dual chamber [place]     CAP (community acquired pneumonia)     Chronic respiratory failure with hypoxia     Acute diastolic CHF (congestive heart failure)     Diastolic heart failure     CHF (congestive heart failure)     Acute diastolic heart failure     Pulmonary emphysema, unspecified emphysema type     Acute pulmonary edema     Dietary noncompliance     Supratherapeutic INR       Social History     Social History     Marital status:      Spouse name: N/A     Number of children: N/A     Years of education: N/A     Occupational History     Not on file.     Social History Main Topics     Smoking status: Former Smoker     Quit date: 12/26/1974     Smokeless tobacco: Never Used      Comment: Pipe     Alcohol use No     Drug use: No     Sexual activity: Yes     Partners: Female     Other Topics Concern     Not on file     Social History Narrative       Family History   Problem Relation Age of Onset     Glaucoma Mother        Current Outpatient Prescriptions   Medication Sig Dispense Refill     albuterol (PROVENTIL) 2.5 mg /3 mL (0.083 %) nebulizer solution Take 3 mL (2.5 mg total) by nebulization every 6 (six) hours as needed for wheezing or shortness of breath. 75 mL 0     aluminum-magnesium hydroxide-simethicone (MAALOX ADVANCED) 200-200-20 mg/5 mL Susp Take 30 mL by mouth every 4 (four) hours as needed (or epigastric distress.).  0     atorvastatin (LIPITOR) 20 MG tablet Take 20 mg by mouth once a week. On Fridays       brimonidine-timolol (COMBIGAN) 0.2-0.5 % ophthalmic solution Administer 1 drop to both eyes 2 (two) times a day.       calcium, as carbonate, (TUMS) 200 mg calcium (500 mg) chewable tablet Chew 1-2 tablets every 6 (six) hours as needed for heartburn.       calcium-vitamin D (CALCIUM-VITAMIN D) 500 mg(1,250mg) -200 unit per  "tablet Take 1 tablet by mouth daily with lunch.       cholecalciferol, vitamin D3, 2,000 unit cap Take 2,000 Units by mouth daily.       diltiazem (CARDIZEM CD) 180 MG 24 hr capsule TAKE ONE CAPSULE BY MOUTH DAILY 90 capsule 0     furosemide (LASIX) 40 MG tablet Take 1 tablet (40 mg total) by mouth 2 (two) times a day at 9am and 6pm. 60 tablet 0     isosorbide mononitrate (IMDUR) 30 MG 24 hr tablet Take 15 mg by mouth 2 (two) times a day.        loperamide (IMODIUM) 1 mg/5 mL solution Take 3 mg by mouth 2 (two) times a week. On Tuesday and Friday evenings       metFORMIN (GLUCOPHAGE) 500 MG tablet Take 500 mg by mouth 3 (three) times a day with meals.        nitroglycerin (NITROSTAT) 0.4 MG SL tablet Place 0.4 mg under the tongue every 5 (five) minutes as needed for chest pain.       omega-3 fatty acids-vitamin E (FISH OIL) 1,000 mg cap Take 1,000 mg by mouth daily.       OXYGEN-AIR DELIVERY SYSTEMS MISC 2 L into each nostril continuous. Indications: COPD       propranolol (INDERAL LA) 160 mg SR capsule Take 160 mg by mouth daily after supper.        ranibizumab (LUCENTIS) 0.5 mg/0.05 mL injection Administer 0.5 mg to the right eye every 2 (two) months.        travoprost (TRAVATAN Z) 0.004 % Drop ophthalmic drops Administer 1 drop to both eyes bedtime.       umeclidinium-vilanterol (ANORO ELLIPTA) 62.5-25 mcg/actuation inhaler Inhale 1 puff daily.       vitamin A-vitamin C-vit E-min (OCUVITE) Tab tablet Take 1 tablet by mouth 2 (two) times a day.       warfarin (COUMADIN) 2 MG tablet Take 2 tablets (4 mg total) by mouth daily. Further doses per INR / primary rec's. 60 tablet 0     No current facility-administered medications for this visit.        Objective:     /66 (Patient Site: Right Arm, Patient Position: Sitting, Cuff Size: Adult Large)  Pulse 64  Resp 18  Ht 6' 1\" (1.854 m)  Wt (!) 249 lb (112.9 kg)  BMI 32.85 kg/m2  (!) 249 lb (112.9 kg)       Physical Exam:    GENERAL APPEARANCE: alert, no " apparent distress  HEENT: no scleral icterus or xanthelasma  NECK: jugular venous pressure does not appear elevated on today's examination  CHEST: symmetric, the lungs are clear to auscultation  CARDIOVASCULAR: Irregular, soft systolic murmur; no carotid bruits  Abdomen: No Organomegaly, masses, bruits, or tenderness. Bowels sounds are present      EXTREMITIES: no cyanosis, clubbing, trace edema    Cardiac Testing:  Patient Information      Patient Name MRN Sex              Age     Hola Garcia [id number] Male 1926 (89 y.o.)       Indications      Congestive heart failure     Dx: Acute diastolic heart failure [I50.31 (ICD-10-CM)]       Summary        When compared to the previous study dated 10/1/2015, no significant change.    Left ventricle ejection fraction is normal. The estimated left ventricular ejection fraction is 55%.    Moderate tricuspid valve regurgitation. Moderate pulmonary hypertension present.    The right ventricle is mildly dilated. The systolic function is mildly reduced.     Study Result      XR CHEST 1 VIEW PORTABLE  4/3/2018 7:45 PM     INDICATION: sob  COMPARISON: None available.     FINDINGS: There is mild cardiac enlargement with pulmonary vascular congestion and mild bilateral perihilar increased interstitial markings suggesting mild interstitial edema. Findings are consistent with mild congestive heart failure. No pneumothorax or   focal consolidation. There is a dual-chamber pacemaker with leads over the right atrium and right ventricle     Atrial fibrillation at 73 bpm, nonspecific ST-T changes.  Appears similar to April 3 although the ST-T segment changes are slightly more pronounced.    Lab Results:    Lab Results   Component Value Date     2018    K 3.6 2018    CL 94 (L) 2018    CO2 36 (H) 2018    BUN 14 2018    CREATININE 0.83 2018    CALCIUM 9.2 2018     Lab Results   Component Value Date    CHOL 120 2017    TRIG 101  12/04/2017    HDL 39 (L) 12/04/2017     BNP (pg/mL)   Date Value   04/03/2018 874 (H)   10/04/2017 144 (H)   02/21/2017 296 (H)     Creatinine (mg/dL)   Date Value   04/04/2018 0.83   04/03/2018 0.81   09/13/2017 0.86   02/21/2017 0.85     LDL Calculated (mg/dL)   Date Value   12/04/2017 61   05/19/2016 63   03/19/2015 65     Lab Results   Component Value Date    WBC 9.6 04/03/2018    WBC 10.3 09/29/2015    HGB 12.2 (L) 04/03/2018    HCT 39.8 (L) 04/03/2018    MCV 96 04/03/2018     04/03/2018               This note has been dictated using voice recognition software. Any grammatical or context distortions are unintentional and inherent to the software.      Dave Honeycutt M.D., F.A.C.C.  [facility]  [phone]

## 2021-06-17 NOTE — PROGRESS NOTES
Assessment/Plan:     1.  Chronic diastolic dysfunction, NYHA class III: Hola Garcia appears well compensated.  He has no acute signs and symptoms of heart failure except faint crackles in bilateral bases, shortness of breath on exertion (feeling improved with recent A. fib medication change) and mild lower extremity edema.  He has h/o chronic respiratory failure with hypoxia and is on  O2 2-4 L per nasal cannula . His baseline weight usually runs from 244-247's.  Recently he has noted his weight dropping rapidly down around as low as 237-238.  Denies loss of appetite, change in diet or recent change in health condition.  He was hospitalized in early April 2018 and discharged on increased dose of Lasix 40 twice a day.  His most recent BNP check done during his clinic visit with Dr. Honeycutt is around 500s (improved).  He follows low-sodium diet at home. Edy Black, ELENA reduce his furosemide from 40 mg twice a day to once a day.  His weight is slowly coming up to 239 and 240s.     Recommended to stay on a same dose of furosemide 40 mg daily.  His heart failure symptoms might have improved likely with better control of A. fib with recent medication change. Highly encouraged to call if persistent rapid weight gain with worsening heart failure symptoms.   He declined to meet with a heart failure nurse clinician to discuss heart failure management.  We briefly discussed about the heart failure symptoms, medication management, and lifestyle management including low-sodium diet, weight monitoring, and exercise.Recommended to stay on low salt diet <2g/day, monitor daily weight, and stay physically active as tolerated.       Heart failure treatment includes:  -  Diuretic therapy with furosemide 40 mg daily  -Last BMP stable on 4/18/2018    2. Hypertension: 120/78-well controlled    3. Persistent atrial fibrillation: Rate controlled in 70s.  Patient saw Edy Wayne on 4/27/2018.  His diltiazem and propranolol was  discontinued and started on sotalol 80 mg twice a day.  He had a follow-up EKG on 5/3/2018 which was reviewed by Thalia with stable QTC noted.  Patient reports overall feeling better since his medications were changed for A. fib management.    4.  Pulmonary hypertension: He has follow-up appointment with Dr. Royal on 6/21/2018    Follow-up with Dr. Royal in June as scheduled.  Subjective:     Hola Garcia is a 89 years old with significant PMH of coronary artery disease, persistent atrial fibrillation, chronic respiratory failure with hypoxia on chronic O2 dependent, moderate to severe pulmonary hypertension, hyperlipidemia, hypertension, and chronic diastolic heart failure who is seen at [facility] heart failure clinic today for follow up regarding recent rapid weight loss.     Patient was recently hospitalized in early April 2018 with heart failure exacerbation and he was discharged on increased dose of furosemide 40 mg twice a day.  Patient saw Dr. Honeycutt on 4/18/2018 and had a repeat echo, BNP, BMP.. His echocardiogram showed an EF of 56% with severe pulmonary hypertension noted.  His BNP was stable and BNP in 500s (improved).     Patient saw Edy Black CNP in A. fib clinic.  With his device check he was noted to have increased A. fib burden to 68% with decreased exercise tolerance.  She stopped his diltiazem and propranolol and switch to sotalol 80 mg twice a day.  Patient had a repeat EKG on 5-18 showed stable QTC.  Patient is scheduled to see Dr. Royal in June 2018 for pulmonary hypertension.    Today, patient presented to the heart failure clinic with a recent rapid weight loss.  Patient was somewhat disappointed that we did not address his rapid weight loss over the phone.  He expressed his difficulties with clinic visits due to his age, health issues and difficulties for his wife to drive.  He denies loss of appetite, recent change in health condition with rapid weight loss.  Overall, he  does feel much better since his A. fib medications were changed.  His baseline weight is usually around 244-247 pounds.  His weight dropped down to as low as 237 pounds with urinary frequency.  Edy Black CNP reduced his furosemide to 40 mg once a day.  He has noted his weight slowly going up to 239 pounds.  He denies fatigue, lightheadedness, shortness of breath, orthopnea, PND, palpitations, chest pain and abdominal fullness/bloating.  He does get some shortness of breath on exertion but feels okay with using his oxygen.  He also has some mild edema in his lower extremities.    Review of Systems:   General: WNL  Eyes: WNL  Ears/Nose/Throat: WNL  Lungs: WNL  Heart: WNL  Stomach: WNL  Bladder: WNL  Muscle/Joints: WNL  Skin: WNL  Nervous System: WNL  Mental Health: WNL     Blood: WNL     Patient Active Problem List   Diagnosis     Essential tremor     Hyperlipidemia     Essential hypertension     Disc disorder     Type II diabetes mellitus, uncontrolled     Persistent atrial fibrillation     Presence of permanent cardiac pacemaker--- dual chamber [place]     CAP (community acquired pneumonia)     Chronic respiratory failure with hypoxia     Pulmonary emphysema, unspecified emphysema type     Dietary noncompliance     Supratherapeutic INR     Pulmonary hypertension, moderate to severe     Chronic diastolic CHF (congestive heart failure), NYHA class 3       Past Medical History:   Diagnosis Date     Acute diastolic CHF (congestive heart failure)      COPD (chronic obstructive pulmonary disease)     Home O2 2 L     Coronary artery disease      Essential tremor      Hyperlipidemia      Hypertension      Pacemaker      Paroxysmal atrial fibrillation      Stroke 2010    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     Social History Main Topics     Smoking status: Former Smoker     Quit date: 12/26/1974     Smokeless tobacco: Never Used      Comment: Pipe     Alcohol use No     Drug use: No     Sexual activity: Yes     Partners: Female     Other Topics Concern     Not on file     Social History Narrative       Current Outpatient Prescriptions   Medication Sig Dispense Refill     albuterol (PROVENTIL) 2.5 mg /3 mL (0.083 %) nebulizer solution Take 3 mL (2.5 mg total) by nebulization every 6 (six) hours as needed for wheezing or shortness of breath. 75 mL 0     aluminum-magnesium hydroxide-simethicone (MAALOX ADVANCED) 200-200-20 mg/5 mL Susp Take 30 mL by mouth every 4 (four) hours as needed (or epigastric distress.).  0     atorvastatin (LIPITOR) 20 MG tablet Take 20 mg by mouth once a week. On Fridays       brimonidine-timolol (COMBIGAN) 0.2-0.5 % ophthalmic solution Administer 1 drop to both eyes 2 (two) times a day.       calcium, as carbonate, (TUMS) 200 mg calcium (500 mg) chewable tablet Chew 1-2 tablets every 6 (six) hours as needed for heartburn.       calcium-vitamin D (CALCIUM-VITAMIN D) 500 mg(1,250mg) -200 unit per tablet Take 1 tablet by mouth daily with lunch.       cholecalciferol, vitamin D3, 2,000 unit cap Take 2,000 Units by mouth daily.       furosemide (LASIX) 40 MG tablet Take 1 tablet (40 mg total) by mouth daily. 60 tablet 0     isosorbide mononitrate (IMDUR) 30 MG 24 hr tablet Take 15 mg by mouth 2 (two) times a day.        loperamide (IMODIUM) 1 mg/5 mL solution Take 3 mg by mouth 2 (two) times a week. On Tuesday and Friday evenings       metFORMIN (GLUCOPHAGE) 500 MG tablet Take 500 mg by mouth 3 (three) times a day with meals.        nitroglycerin  (NITROSTAT) 0.4 MG SL tablet Place 0.4 mg under the tongue every 5 (five) minutes as needed for chest pain.       omega-3 fatty acids-vitamin E (FISH OIL) 1,000 mg cap Take 1,000 mg by mouth daily.       OXYGEN-AIR DELIVERY SYSTEMS MISC 2 L into each nostril continuous. Indications: COPD       ranibizumab (LUCENTIS) 0.5 mg/0.05 mL injection Administer 0.5 mg to the right eye every 2 (two) months.        sotalol (BETAPACE) 80 MG tablet Take 1 tablet (80 mg total) by mouth every 12 (twelve) hours. 180 tablet 1     travoprost (TRAVATAN Z) 0.004 % Drop ophthalmic drops Administer 1 drop to both eyes bedtime.       umeclidinium-vilanterol (ANORO ELLIPTA) 62.5-25 mcg/actuation inhaler Inhale 1 puff daily.       vitamin A-vitamin C-vit E-min (OCUVITE) Tab tablet Take 1 tablet by mouth 2 (two) times a day.       warfarin (COUMADIN) 2 MG tablet Take 2 tablets (4 mg total) by mouth daily. Further doses per INR / primary rec's. 60 tablet 0     No current facility-administered medications for this visit.        Allergies   Allergen Reactions     Codeine Other (See Comments)     Gets sicker on the medication      Percocet [Oxycodone-Acetaminophen] Nausea Only     nausea       Objective:     Vitals:    05/09/18 1526   BP: 120/78   Pulse: 71   Resp: 12     Wt Readings from Last 3 Encounters:   05/09/18 (!) 245 lb (111.1 kg)   05/02/18 (!) 242 lb (109.8 kg)   05/01/18 (!) 240 lb (108.9 kg)       General Appearance:   Alert, cooperative and in no acute distress.   HEENT:  No scleral icterus; the mucous membranes were pink and moist.   Neck: JVP is difficult to assess due to the patient's obesity and body habitus    Chest: The spine was straight. The chest was symmetric.   Lungs:   Respirations unlabored; the lungs are clear to auscultation except faint crackles in bilateral bases   Cardiovascular:   S1 and S2 without murmur, clicks or rubs. Radial and posterior tibial pulses are intact and symmetrical.    Abdomen:   Large but soft,  nontender, nondistended, bowel sounds present   Extremities: No cyanosis, clubbing, except mild 1+ pitting edema in bilateral lower extremities   Skin: No xanthelasma.   Neurologic: Mood and affect are appropriate.         Lab Review   Lab Results   Component Value Date    CREATININE 0.86 04/18/2018    BUN 19 04/18/2018     04/18/2018    K 3.9 04/18/2018    CL 94 (L) 04/18/2018    CO2 34 (H) 04/18/2018     Lab Results   Component Value Date     (H) 04/18/2018     BNP (pg/mL)   Date Value   04/18/2018 579 (H)   04/03/2018 874 (H)   10/04/2017 144 (H)     Creatinine (mg/dL)   Date Value   04/18/2018 0.86   04/04/2018 0.83   04/03/2018 0.81   09/13/2017 0.86       Cardiographics  Echo done on 4/25/2018  Summary     When compared to the previous study dated 3/8/2017, Pulmonary hypertension has worsened and there is increased right ventricular dilatation and dysfunction.    Left ventricle ejection fraction is normal. The calculated left ventricular ejection fraction is 56%.    Normal left ventricular size and systolic function.    Systolic and diastolic flattening of the interventricular septum consistent with right ventricle pressure and volume overload.    Right Ventricle: The right ventricle is moderately dilated. The systolic function is moderately reduced. Pacer lead is present in the ventricle.    Left Atrium: Left atrial volume is moderately increased.    The aortic valve is sclerotic without reduced excursion.    Severe pulmonary hypertension present.     30 minutes were spent face to face with the patient with greater than 50% spent on education and counseling.      Susannah Owens [facility]   Heart Failure Clinic    This note has been dictated using voice recognition software. Any grammatical, typographical, or context distortions are unintentional and inherent to the software

## 2021-06-17 NOTE — PROGRESS NOTES
[facility] HEART [facility]   Arrhythmia Clinic    Assessment/Plan:  Diagnoses and all orders for this visit:    Persistent atrial fibrillation and complaining of more dyspnea on exertion and needing increased oxygen with A. fib episodes.  A. fib burden has gone from 48% to 71%.  Therefore, Hola thinks he is symptomatic with atrial fib.  Due to lung disease, he is not a good candidate for amiodarone.  Fortunately, he has normal kidney function and I messaged Dr. Royal and okay to use sotalol with severe pulmonary hypertension.  Therefore, I recommended on Monday to stop diltiazem and propranolol and will start sotalol 80 mg 1 tablet orally every 12 hours.  To call me if any issues when starting sotalol and this is why I chose to do it during the week.  EKG for QTC check Wednesday.  Discussed that A. fib burden will not dramatically be better but hopefully gradually decreased over  the next month.  If his A. fib burden is not significantly improved on follow-up with Dr. Royal, I would recommend considering increasing sotalol if he thinks QTC will allow it.  -     sotalol (BETAPACE) 80 MG tablet; Take 1 tablet (80 mg total) by mouth every 12 (twelve) hours.  Dispense: 60 tablet; Refill: 3  -     ECG 12 lead with MUSE; Future; Expected date: 5/2/18    Pulmonary hypertension, severe and will set up for new consult with Dr. Royal and hopefully can get this scheduled within a month or so.  To have device check either remote or in clinic prior to this visit so Dr. Royal can reassess A. fib burden at that time.  This is a request by Dr. Honeycutt for consult.  Will delay follow-up with Dr. Honeycutt for 3 months.    Presence of permanent cardiac pacemaker--- dual chamber [place] and device functioning appropriately.    Essential hypertension and well-controlled.  Will need reassessment on follow-up in clinic on May 2 for twelve-lead EKG for QTC with new sotalol start.  Due to advanced age and uses walker due to being unsteady will hold  diltiazem until reassessment in clinic.  May need to restart for rate control and blood pressure control.  Will make decision after BP check and EKG on Wednesday.  Hola checks pulse oximetry fairly often with lung disease and tells me heart rates are generally 70s-80s and occasionally 100s on checks.  He should be able to let me know if his pulse is running higher with medication switch.  40 minutes were spent in face-to-face counseling regarding above diagnoses and options for treatment.    ______________________________________________________________________    Subjective:    I had the opportunity to see Hola Garcia at the [facility]. Hola Garcia is a 89 y.o. male and here for urgent EP appointment secondary to increased A. fib burden .  Hola Garcia has a known history of paroxysmal atrial fibrillation, permanent pacemaker placed in 2015 for sick sinus syndrome, essential hypertension, oxygen dependence and type 2 diabetes.  He has now been shown to have severe pulmonary hypertension on echo.  He complains that when he is in atrial fib he needs to increase his oxygen flow from 2 to up to 4 L at times.  He denies any peripheral edema, PND or sudden increase in exertional dyspnea.  He always has some exertional dyspnea.  It is stable.  His home weight is stable at 241 pounds.  He comes to clinic alone.  His wife drives.  He prefers to come to [place] or [facility] clinic whenever possible.  ______________________________________________________________________    Problem List:  Patient Active Problem List   Diagnosis     Shortness of breath     Essential tremor     Hyperlipidemia     Essential hypertension     Disc disorder     Type II diabetes mellitus, uncontrolled     Persistent atrial fibrillation     Presence of permanent cardiac pacemaker--- dual chamber [place]     CAP (community acquired pneumonia)     Chronic respiratory failure with hypoxia     Acute diastolic heart failure      Pulmonary emphysema, unspecified emphysema type     Dietary noncompliance     Supratherapeutic INR     Pulmonary hypertension, moderate to severe     Medical History:  Past Medical History:   Diagnosis Date     Acute diastolic CHF (congestive heart failure)      COPD (chronic obstructive pulmonary disease)     Home O2 2 L     Coronary artery disease      Essential tremor      Hyperlipidemia      Hypertension      Pacemaker      Paroxysmal atrial fibrillation      Stroke 2010    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     Type 2 diabetes mellitus      Surgical History:  Past Surgical History:   Procedure Laterality Date     BACK SURGERY       EYE SURGERY       INSERT / REPLACE / REMOVE PACEMAKER  10/1/15     TONSILLECTOMY       Social History:  Social History   Substance Use Topics     Smoking status: Former Smoker     Quit date: 12/26/1974     Smokeless tobacco: Never Used      Comment: Pipe     Alcohol use No        Review of Systems: Review of Systems:                                              Family History:  Family History   Problem Relation Age of Onset     Glaucoma Mother          Allergies:  Allergies   Allergen Reactions     Codeine Other (See Comments)     Gets sicker on the medication      Percocet [Oxycodone-Acetaminophen] Nausea Only     nausea     Medications:  Current Outpatient Prescriptions   Medication Sig Dispense Refill     albuterol (PROVENTIL) 2.5 mg /3 mL (0.083 %) nebulizer solution Take 3 mL (2.5 mg total) by nebulization every 6 (six) hours as needed for wheezing or shortness of breath. 75 mL 0     aluminum-magnesium hydroxide-simethicone (MAALOX ADVANCED) 200-200-20 mg/5 mL Susp Take 30 mL by mouth every 4 (four) hours as needed (or epigastric distress.).  0     atorvastatin (LIPITOR) 20 MG tablet Take 20 mg by mouth once a week. On Fridays       brimonidine-timolol (COMBIGAN) 0.2-0.5 % ophthalmic solution Administer 1 drop to both eyes 2 (two) times a day.       calcium, as  carbonate, (TUMS) 200 mg calcium (500 mg) chewable tablet Chew 1-2 tablets every 6 (six) hours as needed for heartburn.       calcium-vitamin D (CALCIUM-VITAMIN D) 500 mg(1,250mg) -200 unit per tablet Take 1 tablet by mouth daily with lunch.       cholecalciferol, vitamin D3, 2,000 unit cap Take 2,000 Units by mouth daily.       furosemide (LASIX) 40 MG tablet Take 1 tablet (40 mg total) by mouth 2 (two) times a day at 9am and 6pm. 60 tablet 0     isosorbide mononitrate (IMDUR) 30 MG 24 hr tablet Take 15 mg by mouth 2 (two) times a day.        loperamide (IMODIUM) 1 mg/5 mL solution Take 3 mg by mouth 2 (two) times a week. On Tuesday and Friday evenings       metFORMIN (GLUCOPHAGE) 500 MG tablet Take 500 mg by mouth 3 (three) times a day with meals.        nitroglycerin (NITROSTAT) 0.4 MG SL tablet Place 0.4 mg under the tongue every 5 (five) minutes as needed for chest pain.       omega-3 fatty acids-vitamin E (FISH OIL) 1,000 mg cap Take 1,000 mg by mouth daily.       OXYGEN-AIR DELIVERY SYSTEMS MISC 2 L into each nostril continuous. Indications: COPD       ranibizumab (LUCENTIS) 0.5 mg/0.05 mL injection Administer 0.5 mg to the right eye every 2 (two) months.        travoprost (TRAVATAN Z) 0.004 % Drop ophthalmic drops Administer 1 drop to both eyes bedtime.       umeclidinium-vilanterol (ANORO ELLIPTA) 62.5-25 mcg/actuation inhaler Inhale 1 puff daily.       vitamin A-vitamin C-vit E-min (OCUVITE) Tab tablet Take 1 tablet by mouth 2 (two) times a day.       warfarin (COUMADIN) 2 MG tablet Take 2 tablets (4 mg total) by mouth daily. Further doses per INR / primary rec's. 60 tablet 0     sotalol (BETAPACE) 80 MG tablet Take 1 tablet (80 mg total) by mouth every 12 (twelve) hours. 60 tablet 3     No current facility-administered medications for this visit.        Objective:   Vital signs:  /78 (Patient Site: Left Arm, Patient Position: Sitting, Cuff Size: Adult Regular)  Pulse 85  Resp 20  Ht 6' (1.829 m)   Wt (!) 245 lb 12.8 oz (111.5 kg)  BMI 33.34 kg/m2      Physical Exam:    GENERAL APPEARANCE: Alert, cooperative and in no acute distress.  HEENT: No scleral icterus. No Xanthelasma. Oral mucuos membranes pink and moist.  NECK: JVP Nl.   CHEST: clear to auscultation  CARDIOVASCULAR: S1, S2 without murmur ,clicks or rubs. Irregular, irregular.  Radial and posterior tibial pulses are intact and symetric.  EXTREMITIES: No cyanosis, clubbing.  1+ right ankle edema and has had high compression stockings on bilaterally.    Results personally reviewed:  Results for orders placed during the hospital encounter of 04/25/18   Echo Complete [ECH10] 04/25/2018    Narrative   When compared to the previous study dated 3/8/2017, Pulmonary   hypertension has worsened and there is increased right ventricular   dilatation and dysfunction.    Left ventricle ejection fraction is normal. The calculated left   ventricular ejection fraction is 56%.    Normal left ventricular size and systolic function.    Systolic and diastolic flattening of the interventricular septum   consistent with right ventricle pressure and volume overload.    Right Ventricle: The right ventricle is moderately dilated. The systolic   function is moderately reduced. Pacer lead is present in the ventricle.    Left Atrium: Left atrial volume is moderately increased.    The aortic valve is sclerotic without reduced excursion.    Severe pulmonary hypertension present.           Results for orders placed during the hospital encounter of 12/25/14   NM Pharmacologic Stress Test    Narrative FINDINGS:  These images demonstrate prominent right ventricle at both   stress and rest.  There is a very small area of mildly reduced tracer uptake   involving the distal anteroseptal wall at stress with redistribution at rest.  The   remainder of the myocardium appears to have normal tracer uptake.  Gated SPECT   demonstrates an ejection fraction of 67%.  No wall motion  abnormality.    CONCLUSION:  1.  Regadenoson stress.  2.  Prominent right ventricle noted at both stress and rest.  3.  Very small area of mild ischemia involving the distal anteroseptal   wall.  No  areas of infarction identified.  4.  Normal ejection fraction without evidence of a regional wall motion   abnormality.     Based on perfusion images, the patient is at very low risk for future   ischemic events.          In clinic device check shows leads stable and battery ok.  Device functioning appropriately.   Atrial pacing less than 1 % and Ventricular pacing 8 %.  AT/AFib burden 71% and previously 48%.  Ventr rates well-controlled with v rates greater than or equal to 120 5% of the time.  Current episode ongoing since 3/27/2018.  Some intermittent atrial undersensing but sensing at maximal sensitivity.  No ventr arrhythmias.      Results for orders placed or performed in visit on 04/18/18   ECG Clinic - Today   Result Value Ref Range    SYSTOLIC BLOOD PRESSURE  mmHg    DIASTOLIC BLOOD PRESSURE  mmHg    VENTRICULAR RATE 73 BPM    ATRIAL RATE 78 BPM    P-R INTERVAL  ms    QRS DURATION 88 ms    Q-T INTERVAL 390 ms    QTC CALCULATION (BEZET) 429 ms    P Axis  degrees    R AXIS 99 degrees    T AXIS -40 degrees    MUSE DIAGNOSIS       Atrial fibrillation  Rightward axis  Nonspecific ST and T wave abnormality  Abnormal ECG  When compared with ECG of 03-APR-2018 19:05,  QT has lengthened  Confirmed by FORD WHITE MD LOC:[facility] ([id number]) on 4/18/2018 4:50:37 PM         TSH:   Lab Results   Component Value Date    TSH 2.86 09/29/2015     BNP:   Lab Results   Component Value Date     (H) 04/18/2018     BMP:  Lab Results   Component Value Date    CREATININE 0.86 04/18/2018    BUN 19 04/18/2018     04/18/2018    K 3.9 04/18/2018    CL 94 (L) 04/18/2018    CO2 34 (H) 04/18/2018       This note has been dictated using voice recognition software. Any grammatical or context distortions are unintentional and inherent  to the software.    YASMINE JOHNSON RN, [facility] HEART [facility]  [phone]

## 2021-06-18 NOTE — LETTER
Letter by Jose Antonio Royal MD at      Author: Jose Antonio Royal MD Service: -- Author Type: --    Filed:  Encounter Date: 2/12/2019 Status: (Other)       Hola Garcia  [address] [place] [address]  [facility] [address]      February 12, 2019      Dear Hola,    This letter is to remind you that you will be due for your follow up appointment with Dr. Jose Antonio Royal. To help ensure you are in the best health possible, a regular follow-up with your cardiologist is essential.     Please call our Patient Scheduling Line at [phone] to schedule your appointment at your earliest convenience.  If you have recently scheduled an appointment, please disregard this letter.    We look forward to seeing you again. As always, we are available at the number  above for any questions or concerns you may have.      Sincerely,     The Physicians and Staff of [facility] [place]

## 2021-06-18 NOTE — LETTER
Letter by Naomi Branham EPS at      Author: Naomi Branham EPS Service: -- Author Type: --    Filed:  Encounter Date: 2/4/2019 Status: (Other)       Hola Garcia  [address] [facility] [address]  [facility] [address]      February 4, 2019      Dear Mr. Garcia,    RE: Remote Results    We are writing to you regarding your recent Remote Pacemaker check from home. Your transmission was received successfully. Battery status is satisfactory at this time.     Your results are within normal limits.    Your next device appointment will be a remote check on May 7th, 2019; this will occur automatically.    To schedule or reschedule, please call [phone] and press 1.    NOTE: If you would like to do an extra transmission, please call [phone] and press 3 to speak to a nurse BEFORE transmitting. This ensures that the Device Clinic staff is aware of the reason you are sending a transmission, and can follow-up with you after it has been reviewed.    We will be checking your implanted device from home (remotely) every three months unless otherwise instructed. We will need to see you in the clinic at least once a year. You may need to be seen in the clinic sooner depending on the results of your check.    Please be aware:    The follow-up schedule is like a Physician prescription.    Your remote monitor is paired to your specific implanted device.      Sincerely,    [facility]

## 2021-06-18 NOTE — PROGRESS NOTES
In clinic device check with Dr. Royal.  Please see link for full device report.  Patient was informed of results and next follow up during today's visit.

## 2021-06-18 NOTE — PROGRESS NOTES
Order placed and faxed.  -fransisco    Good Morning Laura Palomo, Just sent you inbaseket message on Hola Garcia Male, 89 y.o., 6/12/1926. I orderd a BMP for him. Coud you please fax it to his PCP office.    Thank you  Maxe  He has appt todaty

## 2021-06-19 NOTE — PROGRESS NOTES
PFT NOTE    Pt gave good effort & was cooperative, was able to meet ATS standards for acceptability and repeatability. albuterol was given for the bronchodilator. Patient was given oxygen rest breaks. He is on 2 liters at rest, 3 liters walking and 4 liters if he feels he is in A fib.  Annamaria Owens, LRT      RESPIRATORY CARE NOTE     Patient Name: Hola Garcia  Today's Date: 7/30/2018     Problem List  Patient Active Problem List   Diagnosis     Essential hypertension     Type II diabetes mellitus, uncontrolled (H)     Persistent atrial fibrillation (H)     Presence of permanent cardiac pacemaker--- dual chamber [place]     Chronic respiratory failure with hypoxia (H)     Chronic diastolic congestive heart failure (H)     Emphysema of lung (H)     Pulmonary hypertension due to lung diseases and hypoxia, WHO group 3, but also is said to have chronic diastolic heart failure so may be component of group 2 as well     Acute on chronic diastolic heart failure (H)     TIA (transient ischemic attack)     Dizziness     Anemia     PVC's (premature ventricular contractions) causing false impression of bradycardia     Dyslipidemia, goal LDL below 70                           Annamaria Owens

## 2021-06-19 NOTE — PROGRESS NOTES
In clinic device check with Edy Black RN CNP.  Please see link for full device report.  Patient was informed of results and next follow up during today's visit.

## 2021-06-19 NOTE — LETTER
Letter by Geovanna Denny RDCS at      Author: Geovanna Denny RDCS Service: -- Author Type: --    Filed:  Encounter Date: 10/2/2019 Status: Signed         Hola Garcia  [address]  [facility] [address]      October 2, 2019      Dear Mr. Garcia,    RE: Remote Results    We are writing to you regarding your recent Remote Pacemaker check from home. Your transmission was received successfully. Battery status is satisfactory at this time.     Your results are showing no significant changes.    Your next device appointment will be a remote check on January 7, 2020; this will occur automatically.    To schedule or reschedule, please call [phone] and press 1.    NOTE: If you would like to do an extra transmission, please call [phone] and press 3 to speak to a nurse BEFORE transmitting. This ensures that the Device Clinic staff is aware of the reason you are sending a transmission, and can follow-up with you after it has been reviewed.    We will be checking your implanted device from home (remotely) every three months unless otherwise instructed. We will need to see you in the clinic at least once a year. You may need to be seen in the clinic sooner depending on the results of your check.    Please be aware:    The follow-up schedule is like a Physician prescription.    Your remote monitor is paired to your specific implanted device.      Sincerely,    [facility]

## 2021-06-19 NOTE — LETTER
Letter by Mavis Nick at      Author: Mavis Nick Service: -- Author Type: --    Filed:  Encounter Date: 7/2/2019 Status: (Other)         Hola Garcia  [address]  [facility] [address]      July 2, 2019      Dear Mr. Garcia,    RE: Remote Results    We are writing to you regarding your recent Remote Pacemaker check from home. Your transmission was received successfully. Battery status is satisfactory at this time.     Your results are showing no significant changes.    Your next device appointment will be a remote check on October 2, 2019; this will occur automatically.    To schedule or reschedule, please call [phone] and press 1.    NOTE: If you would like to do an extra transmission, please call [phone] and press 3 to speak to a nurse BEFORE transmitting. This ensures that the Device Clinic staff is aware of the reason you are sending a transmission, and can follow-up with you after it has been reviewed.    We will be checking your implanted device from home (remotely) every three months unless otherwise instructed. We will need to see you in the clinic at least once a year. You may need to be seen in the clinic sooner depending on the results of your check.    Please be aware:    The follow-up schedule is like a Physician prescription.    Your remote monitor is paired to your specific implanted device.      Sincerely,    [facility]

## 2021-06-20 NOTE — LETTER
Letter by Mavis Nick at      Author: Mavis Nick Service: -- Author Type: --    Filed:  Encounter Date: 1/7/2020 Status: Signed         Hola Garcia  [address]  [facility] [address]      January 7, 2020      Dear Mr. Garcia,    RE: Remote Results    We are writing to you regarding your recent Remote Pacemaker check from home. Your transmission was received successfully. Battery status is satisfactory at this time.     Your results are showing no significant changes.    Your next device appointment will be a clinic visit.  Please call in February to schedule.      To schedule or reschedule, please call [phone] and press 1.    NOTE: If you would like to do an extra transmission, please call [phone] and press 3 to speak to a nurse BEFORE transmitting. This ensures that the Device Clinic staff is aware of the reason you are sending a transmission, and can follow-up with you after it has been reviewed.    We will be checking your implanted device from home (remotely) every three months unless otherwise instructed. We will need to see you in the clinic at least once a year. You may need to be seen in the clinic sooner depending on the results of your check.    Please be aware:    The follow-up schedule is like a Physician prescription.    Your remote monitor is paired to your specific implanted device.      Sincerely,    [facility]

## 2021-06-25 NOTE — PROGRESS NOTES
Progress Notes by Darya Arzate MD at 2/21/2017  1:50 PM     Author: Darya Arzate MD Service: -- Author Type: Physician    Filed: 2/21/2017  2:49 PM Encounter Date: 2/21/2017 Status: Signed    : Darya Arzate MD (Physician)           Click to link to [facility] HEART CARE NOTE    Thank you, Dr. Segura, for asking us to see Hola Garcia at the [facility].      Assessment/Recommendations   Assessment:    1.  Paroxysmal atrial fibrillation/flutter: Last device check was in November.  We'll need to arrange for repeat device check.  2.  Acute diastolic congestive heart failure: Repeat echocardiogram.  Continue Lasix at 40 mg a day and daily weights.  We'll check BNP and BMP today.  3.  Recommend follow-up with Dr. Honeycutt in a couple months.       History of Present Illness    Mr. Hola Garcia is a 89 y.o. male with history of sinus node dysfunction status post pacemaker placement and paroxysmal atrial fibrillation/flutter on rate control strategy here for a follow-up.  This is his first visit with me.  He is normally seen by Dr. Honeycutt.  He was recently hospitalized with pneumonia and acute diastolic congestive heart failure.  He was discharged on Lasix 40 mg a day as well as Levaquin.  He has been taking his weight daily and it ranges from 248-251 pounds.  He reports that his breathing has improved and he has just mild lower extremity edema.  He has been placed on nocturnal oxygen.  He denies any chest pain or palpitations or dizziness.       Physical Examination Review of Systems   Vitals:    02/21/17 1403   BP: 110/68   Pulse: 60   Resp: 18     Body mass index is 34.45 kg/(m^2).  Wt Readings from Last 3 Encounters:   02/21/17 (!) 254 lb (115.2 kg)   12/31/16 189 lb 3 oz (85.8 kg)   06/07/16 (!) 262 lb (118.8 kg)       General Appearance:   alert, no apparent distress   HEENT:  no scleral icterus; the mucous membranes are pink and moist                                   Neck: jugular venous pressure normal   Chest: the spine is straight and the chest is symmetric   Lungs:   respirations unlabored; the lungs are clear to auscultation   Cardiovascular:   regular rhythm with normal first and second heart sounds and no murmurs or gallops; carotid pulses are intact and there are no carotid  bruits.   Abdomen:  no organomegaly, masses, bruits, or tenderness; bowel sounds are present   Extremities: Mild bilateral lower extremity edema   Skin: no xanthelasma    General: WNL  Eyes: WNL  Ears/Nose/Throat: WNL  Lungs: WNL  Heart: WNL  Stomach: WNL  Bladder: WNL  Muscle/Joints: WNL  Skin: WNL  Nervous System: WNL  Mental Health: WNL     Blood: WNL     Medical History  Surgical History Family History Social History   Past Medical History:   Diagnosis Date   ? Acute diastolic CHF (congestive heart failure)    ? Coronary artery disease    ? Diabetes mellitus type I    ? Essential tremor    ? Hyperlipidemia    ? Hypertension    ? 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 BACK SURGERY     ? EYE SURGERY     ? INSERT / REPLACE / REMOVE PACEMAKER  10/1/15    Family History   Problem Relation Age of Onset   ? No Medical Problems Mother     Social History     Social History   ? Marital status:      Spouse name: N/A   ? Number of children: N/A   ? Years of education: N/A     Occupational History   ? Not on file.     Social History Main Topics   ? Smoking status: Former Smoker     Quit date: 12/26/1974   ? Smokeless tobacco: Not on file      Comment: Pipe   ? Alcohol use 0.0 oz/week     0 Shots of liquor per week   ? Drug use: No   ? Sexual activity: Yes     Partners: Female     Other Topics Concern   ? Not on file     Social History Narrative          Medications  Allergies   Current Outpatient Prescriptions   Medication Sig Dispense Refill   ? albuterol (PROVENTIL) 2.5 mg /3 mL (0.083 %)  nebulizer solution Take 3 mL (2.5 mg total) by nebulization every 6 (six) hours as needed for wheezing or shortness of breath. 75 mL 0   ? atorvastatin (LIPITOR) 20 MG tablet Take 20 mg by mouth once a week. Take on Fridays.     ? brimonidine-timolol (COMBIGAN) 0.2-0.5 % ophthalmic solution Administer 1 drop to both eyes 2 (two) times a day.     ? calcium, as carbonate, (TUMS) 200 mg calcium (500 mg) chewable tablet Chew 1-2 tablets every 6 (six) hours as needed for heartburn.     ? calcium-vitamin D (CALCIUM-VITAMIN D) 500 mg(1,250mg) -200 unit per tablet Take 1 tablet by mouth daily with lunch.     ? cholecalciferol, vitamin D3, 2,000 unit cap Take 2,000 Units by mouth daily.     ? diltiazem (CARDIZEM CD) 180 MG 24 hr capsule Take 1 capsule (180 mg total) by mouth daily. 90 capsule 5   ? furosemide (LASIX) 40 MG tablet Take 1 tablet (40 mg total) by mouth daily. 30 tablet 0   ? isosorbide mononitrate (IMDUR) 30 MG 24 hr tablet Take 15 mg by mouth 2 (two) times a day. Half-tablet     ? metFORMIN (GLUCOPHAGE) 500 MG tablet Take 500 mg by mouth 3 (three) times a day with meals.      ? nitroglycerin (NITROSTAT) 0.4 MG SL tablet Place 0.4 mg under the tongue every 5 (five) minutes as needed for chest pain.     ? omega-3 fatty acids-vitamin E (FISH OIL) 1,000 mg cap Take 1,000 mg by mouth daily.     ? propranolol (INDERAL LA) 160 mg SR capsule Take 160 mg by mouth every evening.      ? ranibizumab (LUCENTIS) 0.5 mg/0.05 mL injection Administer 0.5 mg to the right eye every 2 (two) months.      ? travoprost (TRAVATAN Z) 0.004 % Drop ophthalmic drops Administer 1 drop to both eyes bedtime.     ? warfarin (COUMADIN) 5 MG tablet Take 5 mg by mouth See Admin Instructions. Adjust dose based on INR results as directed.     ? vitamin A-vitamin C-vit E-min (OCUVITE) Tab tablet Take 1 tablet by mouth 2 (two) times a day.       No current facility-administered medications for this visit.       Allergies   Allergen Reactions   ?  Codeine Other (See Comments)     Gets sicker on the medication    ? Percocet [Oxycodone-Acetaminophen] Nausea Only     nausea         Lab Results    Chemistry/lipid CBC Cardiac Enzymes/BNP/TSH/INR   Lab Results   Component Value Date    CHOL 130 05/19/2016    HDL 38 (L) 05/19/2016    LDLCALC 63 05/19/2016    TRIG 143 05/19/2016    CREATININE 0.78 01/01/2017    BUN 17 01/01/2017    K 4.7 01/01/2017     01/01/2017    CL 99 01/01/2017    CO2 32 (H) 01/01/2017    Lab Results   Component Value Date    WBC 7.0 12/31/2016    HGB 11.9 (L) 12/31/2016    HCT 37.8 (L) 12/31/2016    MCV 93 12/31/2016     01/01/2017    Lab Results   Component Value Date    TROPONINI 0.02 12/31/2016     (H) 12/30/2016    TSH 2.86 09/29/2015    INR 2.54 (H) 01/01/2017

## 2021-06-26 NOTE — PROGRESS NOTES
Progress Notes by Adrianne Black CNP at 7/23/2018  2:50 PM     Author: Adrianne Black CNP Service: -- Author Type: Nurse Practitioner    Filed: 7/23/2018  4:48 PM Encounter Date: 7/23/2018 Status: Signed    : Adrianne Black CNP (Nurse Practitioner)          Click to link to [facility]     [facility] HEART CARE ELECTROPHYSIOLOGY NOTE      Assessment/Recommendations   Assessment/Plan:    Diagnoses and all orders for this visit:    Persistent atrial fibrillation (H) and significant decrease in A. fib burden from 71% in April of this year to 26% today but still fairly high burden.  Hypoxemia strong trigger for A. fib.  Due to QTC at limit I cannot increase sotalol dose today.  He is in a workup with Dr. Royal for clarification of COPD and/or pulmonary hypertension.  Therefore I feel uncomfortable about switching him to amiodarone.  Amiodarone would be a better antiarrhythmic if he does not have chronic lung disease.  If Hola has chronic lung disease would recommend staying with sotalol.  He denies any side effects on sotalol.  To continue for now.  -Apixaban (ELIQUIS) 5 mg Tab tablet; Take 1 tablet (5 mg total) by mouth every 12 (twelve) hours.  Dispense: 60 tablet; Refill: 11  Presence of permanent cardiac pacemaker and device functioning appropriately.  TIA suspected on recent hospitalization.  Note lower dose of Eliquis requires 2 of 3 criteria age 80 or over, weight 60 kg or under or creatinine 1.5 or over.  Hola meets advanced age criteria only.  Recommended to increase Eliquis to 5 mg orally every 12 hours and to use up the tablets he has and then switch over to a larger strength with refill.  Chronic diastolic CHF and no signs or symptoms of acute heart failure on alternating dose of furosemide 80 mg every other day with 40 mg every other day  Essential hypertension well controlled and blood pressure monitoring at home usually 100s-110s over 50-60s.    [id number] score of 7  and on Eliquis---see above.  Follow up in clinic with Dr. Royal as planned on August 22 and will follow with me only as needed.     History of Present Illness    Mr. Hola Garcia is a very pleasant 89 y.o. male who comes in today for EP follow-up regarding persistent atrial fibrillation.  Hola Garcia has a known history of COPD and is oxygen dependent, pulmonary hypertension, type 2 diabetes, and persistent atrial fibrillation.  Hola uses oxygen at 2-4 L especially with activity and occasionally at rest will not use oxygen.  He had a recent hospitalization with hypoxemia and question of encephalopathy or TIA.  He has had no further confusion and respiratory status back to normal for him.  When he is in A. Fib, he is more short of breath and feels rapid heart rate such as today.  He tends to increase his oxygen  when he is out of rhythm. Due to shortness of breath, it is difficult to come to clinic today.   He is also more fatigued when in afib.  He feels so much better since sotalol start overall with significant decrease in afib burden.        Cardiographics (personally reviewed):  Results for orders placed during the hospital encounter of 04/25/18   Echo Complete [ECH10] 04/25/2018    Narrative   When compared to the previous study dated 3/8/2017, Pulmonary   hypertension has worsened and there is increased right ventricular   dilatation and dysfunction.    Left ventricle ejection fraction is normal. The calculated left   ventricular ejection fraction is 56%.    Normal left ventricular size and systolic function.    Systolic and diastolic flattening of the interventricular septum   consistent with right ventricle pressure and volume overload.    Right Ventricle: The right ventricle is moderately dilated. The systolic   function is moderately reduced. Pacer lead is present in the ventricle.    Left Atrium: Left atrial volume is moderately increased.    The aortic valve is sclerotic without reduced excursion.     Severe pulmonary hypertension present.          Results for orders placed during the hospital encounter of 12/25/14   NM Pharmacologic Stress Test    Narrative   Gated SPECT demonstrates an ejection fraction of 67%.  No wall motion abnormality.    CONCLUSION:  1.  Regadenoson stress.  2.  Prominent right ventricle noted at both stress and rest.  3.  Very small area of mild ischemia involving the distal anteroseptal   wall.  No  areas of infarction identified.  4.  Normal ejection fraction without evidence of a regional wall motion   abnormality.     Based on perfusion images, the patient is at very low risk for future   ischemic events.        Device check shows leads stable and battery ok.  Device functioning appropriately.   Atrial pacing 2% and Ventricular pacing less than 1 %.  AT/AFib burden 26% and had been 46% on previous check and ventricular rates greater than or equal to 120 10-15% of the time.  Current episode in progress since July 23, 2018 at 6 AM and acutely aware of beginning. No true ventr arrhythmias as EGM show A. fib with RVR on 2 episodes stored.      Results for orders placed or performed during the hospital encounter of 07/12/18   ECG 12 lead   Result Value Ref Range    SYSTOLIC BLOOD PRESSURE 169 mmHg    DIASTOLIC BLOOD PRESSURE 83 mmHg    VENTRICULAR RATE 63 BPM    ATRIAL RATE 63 BPM    P-R INTERVAL 182 ms    QRS DURATION 90 ms    Q-T INTERVAL 524 ms    QTC CALCULATION (BEZET) 536 ms    P Axis 22 degrees    R AXIS 95 degrees    T AXIS -50 degrees    MUSE DIAGNOSIS       Sinus rhythm with occasional Premature ventricular complexes  Possible Left atrial enlargement  Rightward axis  ST & T wave abnormality, consider inferior ischemia  ST & T wave abnormality, consider anterolateral ischemia  Prolonged QT  Abnormal ECG  When compared with ECG of 02-MAY-2018 13:34,  Significant changes have occurred  Substantial anterior STT changes and long QT now present  consider acute ischemia  Confirmed by  TIBURCIO MULLIGAN MD LOC:[facility] ([id number]) on 7/13/2018 1:46:37 PM            Problem List:  Patient Active Problem List   Diagnosis   ? Essential hypertension   ? Type II diabetes mellitus, uncontrolled (H)   ? Persistent atrial fibrillation (H)   ? Presence of permanent cardiac pacemaker--- dual chamber [place]   ? Chronic respiratory failure with hypoxia (H)   ? Acute diastolic congestive heart failure (H)   ? Emphysema of lung (H)   ? Pulmonary hypertension due to lung diseases and hypoxia, WHO group 3, but also is said to have chronic diastolic heart failure so may be component of group 2 as well   ? TIA (transient ischemic attack)   ? Transient alteration of awareness   ? Dizziness   ? Anemia   ? PVC's (premature ventricular contractions) causing false impression of bradycardia   ? Dyslipidemia, goal LDL below 70   ? Acute on chronic respiratory failure with hypoxia (H)       Physical Examination Review of Systems   Vitals:    07/23/18 1425   BP: 110/58   Pulse: 88   Resp: 20     Body mass index is 30.92 kg/(m^2).  Wt Readings from Last 3 Encounters:   07/23/18 (!) 228 lb (103.4 kg)   07/13/18 (!) 233 lb 0.4 oz (105.7 kg)   07/12/18 (!) 234 lb (106.1 kg)     General Appearance:   Alert, well-appearing and in no acute distress.   HEENT: Atraumatic, normocephalic.  No scleral icterus, normal conjunctivae; mucous membranes pink and moist.     Chest: Chest symmetric, spine straight.   Lungs:   Respirations unlabored: Lungs are clear to auscultation.   Cardiovascular:   Normal first and second heart sounds with no murmurs, rubs, or gallops.  Irregular, irregular.  Radial and posterior tibial pulses are intact.  Normal JVD, no edema and wearing knee-high compression stockings bilaterally.       Extremities: No cyanosis or clubbing   Musculoskeletal: Moves all extremities   Skin: Warm, dry, intact.    Neurologic: Mood and affect are appropriate, alert and oriented to person, place, time, and situation    General: WNL  Eyes:  WNL  Ears/Nose/Throat: WNL  Lungs: WNL  Heart: WNL  Stomach: WNL  Bladder: WNL  Muscle/Joints: WNL  Skin: WNL  Nervous System: WNL  Mental Health: WNL     Blood: WNL       Medical History  Surgical History Family History Social History   Past Medical History:   Diagnosis Date   ? Anemia 12/25/2014    cause not specified in medical records   ? CAP (community acquired pneumonia) 12/30/2016   ? Chronic diastolic heart failure (H)    ? COPD (chronic obstructive pulmonary disease) (H)     oxygen dependent   ? Disc disorder 04/30/2014    s/p lumbar disk surgery  4/30/2014 recent increase L leg pain but MRI showed nothing to operate on.     ? Dyslipidemia, goal LDL below 70    ? Emphysema of lung (H)    ? Essential hypertension    ? Essential tremor    ? Paroxysmal atrial fibrillation (H)    ? Pulmonary hypertension due to lung diseases and hypoxia, WHO group 3, but also is said to have chronic diastolic heart failure so may be component of group 2 as well 12/26/2014   ? Sick sinus syndrome (H) 10/01/2015    received a pacer   ? 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 Type 2 diabetes mellitus (H)     Past Surgical History:   Procedure Laterality Date   ? A-V CARDIAC PACEMAKER INSERTION  10/01/2015    Dr. Dobson for sick sinus syndrome   ? BACK SURGERY     ? EYE SURGERY     ? TONSILLECTOMY      Family History   Problem Relation Age of Onset   ? Glaucoma Mother    ? Other Father      Hola did not have contact with his father as his parents     Social History     Social History   ? Marital status:      Spouse name: Oliverio Garcia   ? Number of children: 0   ? Years of education: N/A     Occupational History   ? 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 Smoking status: Former Smoker     Types: Pipe     Quit date: 12/26/1974   ? Smokeless tobacco: Never Used   ? Alcohol use No   ? Drug use: No   ? Sexual activity: Yes      Partners: Female     Other Topics Concern   ? Not on file     Social History Narrative    His wife, Oliverio, has heart failure and COPD. They have two adopted children. 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           Medications  Allergies   Current Outpatient Prescriptions   Medication Sig Dispense Refill   ? apixaban (ELIQUIS) 5 mg Tab tablet Take 1 tablet (5 mg total) by mouth every 12 (twelve) hours. 60 tablet 11   ? atorvastatin (LIPITOR) 20 MG tablet Take 20 mg by mouth once a week. On Fridays     ? brimonidine-timolol (COMBIGAN) 0.2-0.5 % ophthalmic solution Administer 1 drop to both eyes 2 (two) times a day.     ? calcium-vitamin D (CALCIUM-VITAMIN D) 500 mg(1,250mg) -200 unit per tablet Take 1 tablet by mouth daily with lunch.     ? cholecalciferol, vitamin D3, 2,000 unit cap Take 2,000 Units by mouth daily.     ? furosemide (LASIX) 40 MG tablet Take 40 mg by mouth every other day.     ? furosemide (LASIX) 40 MG tablet Take 80 mg by mouth every other day.     ? isosorbide mononitrate (IMDUR) 30 MG 24 hr tablet Take 15 mg by mouth 2 (two) times a day.      ? loperamide (IMODIUM) 1 mg/5 mL solution Take 3 mg by mouth once a week. On Fridays     ? metFORMIN (GLUCOPHAGE) 500 MG tablet Take 1 tablet (500 mg total) by mouth 3 (three) times a day with meals. Resume on 7/14 at supper given recent contrast exposure  0   ? nitroglycerin (NITROSTAT) 0.4 MG SL tablet Place 0.4 mg under the tongue every 5 (five) minutes as needed for chest pain.     ? omega-3 fatty acids-vitamin E (FISH OIL) 1,000 mg cap Take 1,000 mg by mouth daily.     ? OXYGEN-AIR DELIVERY SYSTEMS MISC 2 L into each nostril continuous. Indications: COPD     ? ranibizumab (LUCENTIS) 0.5 mg/0.05 mL injection Administer 0.5 mg to the right eye every 2 (two) months.      ? sotalol (BETAPACE) 80 MG tablet Take 1 tablet (80 mg total) by mouth every 12 (twelve) hours. 180 tablet 1   ? travoprost  (TRAVATAN Z) 0.004 % Drop ophthalmic drops Administer 1 drop to both eyes bedtime.     ? umeclidinium-vilanterol (ANORO ELLIPTA) 62.5-25 mcg/actuation inhaler Inhale 1 puff daily.     ? vitamin A-vitamin C-vit E-min (OCUVITE) Tab tablet Take 1 tablet by mouth 2 (two) times a day.       No current facility-administered medications for this visit.       Allergies   Allergen Reactions   ? Codeine Other (See Comments)     Gets sicker on the medication    ? Percocet [Oxycodone-Acetaminophen] Nausea Only     nausea      Medical, surgical, family, social history, and medications were all reviewed and updated as necessary.   Lab Results    Chemistry CBC/INR CHOLESTROL   Lab Results   Component Value Date    CREATININE 0.75 07/12/2018    BUN 18 07/12/2018     07/12/2018    K 3.7 07/12/2018    CL 97 (L) 07/12/2018    CO2 28 07/12/2018     Creatinine (mg/dL)   Date Value   07/12/2018 0.75   07/12/2018 0.81   06/15/2018 0.99   05/21/2018 0.90     Lab Results   Component Value Date     (H) 07/12/2018    Lab Results   Component Value Date    WBC 7.2 07/12/2018    HGB 11.0 (L) 07/12/2018    HCT 34.8 (L) 07/12/2018    MCV 92 07/12/2018     07/12/2018     Lab Results   Component Value Date    INR 1.53 (H) 07/12/2018      Lab Results   Component Value Date    CHOL 120 12/04/2017    HDL 39 (L) 12/04/2017    LDLCALC 61 12/04/2017    TRIG 101 12/04/2017          Greater than than 25 minutes were spent face to face in this visit discussing diagnoses as listed above, counseling, and coordination of care.    This note has been dictated using voice recognition software. Any grammatical, typographical, or context distortions are unintentional and inherent to the software.    Adrianne Black RN,  [facility]   Electrophysiology  [phone]

## 2021-06-26 NOTE — PROGRESS NOTES
Progress Notes by Jose Antonio Royal MD at 6/21/2018  2:50 PM     Author: Jose Antonio Royal MD Service: -- Author Type: Physician    Filed: 6/21/2018  3:09 PM Encounter Date: 6/21/2018 Status: Signed    : Jose Antonio Royal MD (Physician)           Click to link to [facility]pert[facility] Consultation Note    Thank you, Dr. Dave Honeycutt, for asking Hola Garcia to meet with me in consultation today to evaluate pulmonary hypertension in the setting of chronic lung disease with chronic hypoxemic respiratory failure.     Unfortunately, only 20 minutes was scheduled for this new consultation for pulmonary hypertension.    Assessment:    1. Pulmonary hypertension due to lung diseases and hypoxia, WHO group 3, but also is said to have chronic diastolic heart failure so may be component of group 2 as well  Thyroid Stimulating Hormone (TSH)    Antinuclear Antibodies Screen (MARÍA)    PFT Complete   2. Chronic respiratory failure with hypoxia (H)       Discussion:    In point of fact, there are no pulmonary function test or imaging test in our record to substantiate his diagnosis of chronic lung disease.  He was a pipe smoker with some exposure to secondhand smoke.  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    Plan:    1. We will obtain screening laboratory today as well as a complete pulmonary function test with diffusing capacity.  We will call him with the test results.  I would like to see Hola again in a few weeks to ensure that we have reviewed his history adequately and answered all of his questions as the limited time allotted for this visit today was not appropriate for the complexity of a new consultation for pulmonary hypertension.  If he does not have evidence of significant lung disease, then I think we should embark on a thorough evaluation for other causes of pulmonary hypertension.    An After Visit Summary was  printed and given to the patient.    Primary Cardiologist: Dr. Dave Honeycutt    Current History:    I met with Hola today for a pulmonary hypertension consultation is requested by Dr. Dave Honeycutt.  In the limited time that was available I learned that although he is said to have chronic hypoxemic respiratory failure due to emphysema, there are no pulmonary function or imaging test and her records to substantiate that diagnosis.    His echocardiogram showed gradual increase in his pulmonary artery pressure over the last 4 years.    He does not have other risk factors for pulmonary hypertension such as use of prescription weight loss medicines, Raynaud's phenomena, scleroderma, cirrhosis or history of pulmonary embolism.    Past Medical History:  Past Medical History:   Diagnosis Date   ? CAP (community acquired pneumonia) 12/30/2016   ? Chronic diastolic heart failure (H)    ? COPD (chronic obstructive pulmonary disease) (H)     oxygen dependent   ? Disc disorder 04/30/2014    s/p lumbar disk surgery  4/30/2014 recent increase L leg pain but MRI showed nothing to operate on.     ? Emphysema of lung (H)    ? Essential hypertension    ? Essential tremor    ? Hyperlipidemia    ? Paroxysmal atrial fibrillation (H)    ? Pulmonary hypertension due to lung diseases and hypoxia, WHO group 3, but also is said to have chronic diastolic heart failure so may be component of group 2 as well 12/26/2014   ? 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 Type 2 diabetes mellitus (H)      Past Medical History Pertinent Negatives:   Diagnosis Date Noted   ? Asthma 09/30/2015   ? Cirrhosis (H) 06/21/2018   ? Disease of thyroid gland 09/30/2015   ? Pulmonary embolism (H) 06/21/2018   ? Raynaud phenomenon 06/21/2018   ? Scleroderma (H) 06/21/2018       Past Surgical History:  Past Surgical History:   Procedure Laterality Date   ? BACK SURGERY     ? EYE SURGERY     ? INSERT / REPLACE / REMOVE PACEMAKER  10/1/15   ?  TONSILLECTOMY       Past Surgical History Pertinent Negatives:   Procedure Date Noted   ? SPLENECTOMY 06/21/2018       Family History:  Family History   Problem Relation Age of Onset   ? Glaucoma Mother    ? Other Father      Hola did not have contact with his father as his parents        Social History:   reports that he quit smoking about 43 years ago. His smoking use included Pipe. He has never used smokeless tobacco. He reports that he does not drink alcohol or use illicit drugs.    Medications:  Outpatient Encounter Prescriptions as of 6/21/2018   Medication Sig Dispense Refill   ? albuterol (PROVENTIL) 2.5 mg /3 mL (0.083 %) nebulizer solution Take 3 mL (2.5 mg total) by nebulization every 6 (six) hours as needed for wheezing or shortness of breath. 75 mL 0   ? aluminum-magnesium hydroxide-simethicone (MAALOX ADVANCED) 200-200-20 mg/5 mL Susp Take 30 mL by mouth every 4 (four) hours as needed (or epigastric distress.).  0   ? atorvastatin (LIPITOR) 20 MG tablet Take 20 mg by mouth once a week. On Fridays     ? brimonidine-timolol (COMBIGAN) 0.2-0.5 % ophthalmic solution Administer 1 drop to both eyes 2 (two) times a day.     ? calcium, as carbonate, (TUMS) 200 mg calcium (500 mg) chewable tablet Chew 1-2 tablets every 6 (six) hours as needed for heartburn.     ? calcium-vitamin D (CALCIUM-VITAMIN D) 500 mg(1,250mg) -200 unit per tablet Take 1 tablet by mouth daily with lunch.     ? cholecalciferol, vitamin D3, 2,000 unit cap Take 2,000 Units by mouth daily.     ? furosemide (LASIX) 40 MG tablet Take 1 tablet (40 mg total) by mouth daily. 60 tablet 0   ? furosemide (LASIX) 40 MG tablet Take 1 tablet (40 mg total) by mouth daily. 90 tablet 0   ? isosorbide mononitrate (IMDUR) 30 MG 24 hr tablet Take 15 mg by mouth 2 (two) times a day.      ? loperamide (IMODIUM) 1 mg/5 mL solution Take 3 mg by mouth 2 (two) times a week. On Tuesday and Friday evenings     ? metFORMIN (GLUCOPHAGE) 500 MG tablet Take 500  mg by mouth 3 (three) times a day with meals.      ? nitroglycerin (NITROSTAT) 0.4 MG SL tablet Place 0.4 mg under the tongue every 5 (five) minutes as needed for chest pain.     ? omega-3 fatty acids-vitamin E (FISH OIL) 1,000 mg cap Take 1,000 mg by mouth daily.     ? OXYGEN-AIR DELIVERY SYSTEMS MISC 2 L into each nostril continuous. Indications: COPD     ? ranibizumab (LUCENTIS) 0.5 mg/0.05 mL injection Administer 0.5 mg to the right eye every 2 (two) months.      ? sotalol (BETAPACE) 80 MG tablet Take 1 tablet (80 mg total) by mouth every 12 (twelve) hours. 180 tablet 1   ? travoprost (TRAVATAN Z) 0.004 % Drop ophthalmic drops Administer 1 drop to both eyes bedtime.     ? umeclidinium-vilanterol (ANORO ELLIPTA) 62.5-25 mcg/actuation inhaler Inhale 1 puff daily.     ? vitamin A-vitamin C-vit E-min (OCUVITE) Tab tablet Take 1 tablet by mouth 2 (two) times a day.     ? warfarin (COUMADIN) 2 MG tablet Take 2 tablets (4 mg total) by mouth daily. Further doses per INR / primary rec's. 60 tablet 0     No facility-administered encounter medications on file as of 6/21/2018.        Allergies:  Codeine and Percocet [oxycodone-acetaminophen]    Review of Systems:     General: WNL  Eyes: WNL  Ears/Nose/Throat: WNL  Lungs: WNL  Heart: WNL  Stomach: WNL  Bladder: WNL  Muscle/Joints: WNL  Skin: WNL  Nervous System: WNL  Mental Health: WNL     Blood: WNL    Objective:    Wt Readings from Last 5 Encounters:   06/21/18 (!) 235 lb (106.6 kg)   05/09/18 (!) 245 lb (111.1 kg)   05/02/18 (!) 242 lb (109.8 kg)   05/01/18 (!) 240 lb (108.9 kg)   04/27/18 (!) 245 lb 12.8 oz (111.5 kg)      6' (1.829 m)  Body mass index is 31.87 kg/(m^2).  /62 (Patient Site: Left Arm, Patient Position: Sitting, Cuff Size: Adult Large)  Pulse 64  Resp 14  Ht 6' (1.829 m)  Wt (!) 235 lb (106.6 kg)  BMI 31.87 kg/m2     Physical Exam:      General Appearance: Alert and not in distress   HEENT: No scleral icterus; the mucous membranes are pink and  moist   Neck: No cervical bruits, adenopathy, or thyromegaly; jugular venous pressure was difficult to evaluate due to obesity   Chest: The chest is symmetric   Lungs: Respirations are unlabored; the lungs are clear to auscultation   Cardiovasular: Auscultation reveals normal first and normal second heart sound with no murmurs, rubs, or gallops; the carotid, radial, and posterior tibial pulses are intact   Abdomen: No organomegaly, masses, bruits, or tenderness; bowel sounds are present   Extremities: No cyanosis, clubbing or edema   Skin: No xanthelasma   Neurologic: Mood and affect are appropriate     Cardiac testing:    Echocardiogram:   Results for orders placed during the hospital encounter of 04/25/18   Echo Complete [ECH10] 04/25/2018    Narrative   When compared to the previous study dated 3/8/2017, Pulmonary   hypertension has worsened and there is increased right ventricular   dilatation and dysfunction.    Left ventricle ejection fraction is normal. The calculated left   ventricular ejection fraction is 56%.    Normal left ventricular size and systolic function.    Systolic and diastolic flattening of the interventricular septum   consistent with right ventricle pressure and volume overload.    Right Ventricle: The right ventricle is moderately dilated. The systolic   function is moderately reduced. Pacer lead is present in the ventricle.    Left Atrium: Left atrial volume is moderately increased.    The aortic valve is sclerotic without reduced excursion.    Severe pulmonary hypertension present.          Pulmonary:    No testing found.    Imaging:    No results found.    Lab Review:    Lab Results   Component Value Date     06/15/2018    K 4.0 06/15/2018    CL 99 06/15/2018    CO2 33 (H) 06/15/2018    BUN 18 06/15/2018    CREATININE 0.99 06/15/2018    CALCIUM 9.3 06/15/2018     Lab Results   Component Value Date    WBC 9.6 04/03/2018    WBC 10.3 09/29/2015    HGB 12.2 (L) 04/03/2018    HCT 39.8  (L) 04/03/2018    MCV 96 04/03/2018     04/03/2018     TSH (uIU/mL)   Date Value   09/29/2015 2.86     No results found for: MARÍA     Lab Results   Component Value Date    ALT 11 06/15/2018    AST 14 06/15/2018    ALKPHOS 52 06/15/2018    BILITOT 0.7 06/15/2018     BNP (pg/mL)   Date Value   04/18/2018 579 (H)   04/03/2018 874 (H)   10/04/2017 144 (H)           Much or all of the text in this note was generated through the use of the Dragon Dictate voice-to-text software. Errors in spelling or words which seem out of context are unintentional. Sound alike errors, in particular, may have escaped editing.

## 2021-06-26 NOTE — PROGRESS NOTES
Progress Notes by Jose Antonio Royal MD at 8/30/2018  1:50 PM     Author: Jose Antonio Royal MD Service: -- Author Type: Physician    Filed: 8/30/2018  2:31 PM Encounter Date: 8/30/2018 Status: Signed    : Jose Antonio Royal MD (Physician)           Click to link to [facility] Pulmonary Hypertension Clinic Note    Assessment:    1. Pulmonary hypertension due to lung diseases and hypoxia, WHO group 3         Plan:    1. His pulmonary function testing demonstrated restrictive, not obstructive disease.  Therefore, we will arrange for consultation with 1 of my colleagues in the pulmonary team who work with patients with pulmonary hypertension for their expert advice.  I will hold off on further testing at this time such as ventilation perfusion scanning, this and left and right heart catheterization could be reconsidered after Hola has his pulmonary evaluation.  2. Return to see Dr. Royal in 3 months to review his clinical status.    An After Visit Summary was printed and given to the patient.    Subjective:    Hola Garcia returned for a planned follow up visit in the Pulmonary Hypertension Clinic.    He states he has an oxygen saturation meter and if he sits quietly his saturations stays above 90%.  Thus, he is using oxygen mostly when he exerts himself but he always uses it at night.    He does not experience angina pectoris, syncope, orthopnea, or significant lower extremity edema.    He had been told in the past that he had both emphysema and chronic obstructive pulmonary disease, however, the pulmonary testing noted below's appears to refute that. Therefore, I removed those diagnoses from his record for the time being.    Problem List:    Patient Active Problem List   Diagnosis   ? Essential hypertension   ? Persistent atrial fibrillation (H)   ? Presence of permanent cardiac pacemaker--- dual chamber [place]   ? Chronic respiratory failure with hypoxia (H)   ? Pulmonary  hypertension due to lung diseases and hypoxia, WHO group 3   ? Anemia   ? Dyslipidemia, goal LDL below 70   ? DM2 (diabetes mellitus, type 2) (H)       Past Medical History:   Diagnosis Date   ? Anemia 12/25/2014    cause not specified in medical records   ? CAP (community acquired pneumonia) 12/30/2016   ? Disc disorder 04/30/2014    s/p lumbar disk surgery  4/30/2014 recent increase L leg pain but MRI showed nothing to operate on.     ? Dyslipidemia, goal LDL below 70    ? Essential hypertension    ? Essential tremor    ? Persistent atrial fibrillation (H)    ? Pulmonary hypertension due to lung diseases and hypoxia, WHO group 3 12/26/2014   ? PVC's (premature ventricular contractions) causing false impression of bradycardia 7/12/2018    See EKG done in ER at [facility]; pacer function was normal   ? Sick sinus syndrome (H) 10/01/2015    received a pacer   ? 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 Type 2 diabetes mellitus (H)        Past Surgical History:   Procedure Laterality Date   ? A-V CARDIAC PACEMAKER INSERTION  10/01/2015    Dr. Dobson for sick sinus syndrome   ? BACK SURGERY     ? EYE SURGERY     ? TONSILLECTOMY         Past Surgical History Pertinent Negatives:   Procedure Date Noted   ? SPLENECTOMY 06/21/2018        reports that he quit smoking about 43 years ago. His smoking use included Pipe. He has never used smokeless tobacco. He reports that he does not drink alcohol or use illicit drugs.    Family History   Problem Relation Age of Onset   ? Glaucoma Mother    ? Other Father      Hola did not have contact with his father as his parents        Outpatient Encounter Prescriptions as of 8/30/2018   Medication Sig Dispense Refill   ? apixaban (ELIQUIS) 5 mg Tab tablet Take 1 tablet (5 mg total) by mouth every 12 (twelve) hours. 60 tablet 11   ? atorvastatin (LIPITOR) 20 MG tablet Take 20 mg by mouth once a week. On Fridays     ? brimonidine-timolol  (COMBIGAN) 0.2-0.5 % ophthalmic solution Administer 1 drop to both eyes 2 (two) times a day.     ? calcium-vitamin D (CALCIUM-VITAMIN D) 500 mg(1,250mg) -200 unit per tablet Take 1 tablet by mouth daily with lunch.     ? cholecalciferol, vitamin D3, 2,000 unit cap Take 2,000 Units by mouth daily.     ? furosemide (LASIX) 40 MG tablet Take 40 mg by mouth every other day.     ? furosemide (LASIX) 40 MG tablet Take 80 mg by mouth every other day.     ? isosorbide mononitrate (IMDUR) 30 MG 24 hr tablet Take 15 mg by mouth 2 (two) times a day.      ? loperamide (IMODIUM) 1 mg/5 mL solution Take 3 mg by mouth once a week. On Fridays     ? metFORMIN (GLUCOPHAGE) 500 MG tablet Take 1 tablet (500 mg total) by mouth 3 (three) times a day with meals. Resume on 7/14 at supper given recent contrast exposure  0   ? nitroglycerin (NITROSTAT) 0.4 MG SL tablet Place 0.4 mg under the tongue every 5 (five) minutes as needed for chest pain.     ? omega-3 fatty acids-vitamin E (FISH OIL) 1,000 mg cap Take 1,000 mg by mouth daily.     ? OXYGEN-AIR DELIVERY SYSTEMS MISC 2 L into each nostril continuous. Indications: COPD     ? ranibizumab (LUCENTIS) 0.5 mg/0.05 mL injection Administer 0.5 mg to the right eye every 2 (two) months.      ? sotalol (BETAPACE) 80 MG tablet Take 1 tablet (80 mg total) by mouth every 12 (twelve) hours. 180 tablet 1   ? travoprost (TRAVATAN Z) 0.004 % Drop ophthalmic drops Administer 1 drop to both eyes bedtime.     ? umeclidinium-vilanterol (ANORO ELLIPTA) 62.5-25 mcg/actuation inhaler Inhale 1 puff daily.     ? vitamin A-vitamin C-vit E-min (OCUVITE) Tab tablet Take 1 tablet by mouth 2 (two) times a day.       No facility-administered encounter medications on file as of 8/30/2018.        Review of Systems:    General: WNL  Eyes: WNL  Ears/Nose/Throat: WNL  Lungs: WNL  Heart: WNL  Stomach: WNL  Bladder: WNL  Muscle/Joints: WNL  Skin: WNL  Nervous System: WNL  Mental Health: WNL     Blood: WNL    Objective:      /76 (Patient Site: Left Arm, Patient Position: Sitting, Cuff Size: Adult Large)  Pulse 62  Resp 16  Wt (!) 230 lb (104.3 kg) Comment: Pt. weighed themselves this AM.  BMI 31.19 kg/m2  Wt Readings from Last 5 Encounters:   08/30/18 (!) 230 lb (104.3 kg)   08/10/18 (!) 229 lb 8 oz (104.1 kg)   07/23/18 (!) 228 lb (103.4 kg)   07/13/18 (!) 233 lb 0.4 oz (105.7 kg)   07/12/18 (!) 234 lb (106.1 kg)         Physical Exam:      GENERAL APPEARANCE: alert, no apparent distress  EYES: no scleral icterus  NECK: no carotid bruits or adenopathy, jugular venous pressure is difficult to evaluate due to obesity  CHEST: symmetric, the lungs are clear to auscultation  CARDIOVASCULAR: regular rhythm with normal first heart sound and normal second heart sound, no murmur or gallop  EXTREMITIES: no cyanosis, clubbing or edema, though, he is wearing compression stockings  SKIN: no xanthelasma  NEUROLOGIC: normal gait and coordination  PSYCHIATRIC: mood and affect are normal      Cardiac Testing:    Echocardiogram:   Results for orders placed during the hospital encounter of 04/25/18   Echo Complete [ECH10] 04/25/2018    Narrative   When compared to the previous study dated 3/8/2017, Pulmonary   hypertension has worsened and there is increased right ventricular   dilatation and dysfunction.    Left ventricle ejection fraction is normal. The calculated left   ventricular ejection fraction is 56%.    Normal left ventricular size and systolic function.    Systolic and diastolic flattening of the interventricular septum   consistent with right ventricle pressure and volume overload.    Right Ventricle: The right ventricle is moderately dilated. The systolic   function is moderately reduced. Pacer lead is present in the ventricle.    Left Atrium: Left atrial volume is moderately increased.    The aortic valve is sclerotic without reduced excursion.    Severe pulmonary hypertension present.          Pulmonary:    PFT Complete  (Order [id number])   PFT   Date: 7/30/2018 Department: [facility] Respiratory Released By: Ruba Epps Authorizing: Perla Ahumada MD   Study Result   FEV1/FVC is 90 and is normal.  FEV1 is 74% predicted and is reduced.  FVC is 59% predicted and reduced.  There was no improvement in spirometry after a single inhaled dose of bronchodilator.  TLC is 53% predicted and is reduced.  RV is 55% predicted and is reduced.  DLCO is 64% predicted and is reduced when it   is corrected for hemoglobin.     Impression:  Full Pulmonary Function Test is abnormal.  PFTs are consistent with moderate restrictive disease.  Diffusion capacity when corrected for hemoglobin is mildly reduced.       Imaging:    Xr Chest 2 Views    Result Date: 8/10/2018  XR CHEST 2 VIEWS 8/10/2018 8:01 PM INDICATION: Chest pain COMPARISON: 07/12/2018 FINDINGS: Cardiomegaly. Pulmonary vascularity normal. Mild interstitial densities both lungs suggesting edema with minimal change. No new infiltrates or effusions. Pacemaker wire tips unchanged in position.      Lab Results:    Lab Results   Component Value Date     08/11/2018    K 4.2 08/11/2018    CL 97 (L) 08/11/2018    CO2 34 (H) 08/11/2018    BUN 19 08/11/2018    CREATININE 0.85 08/11/2018    CALCIUM 9.7 08/11/2018     Lab Results   Component Value Date    WBC 9.2 08/11/2018    WBC 10.3 09/29/2015    HGB 12.6 (L) 08/11/2018    HCT 40.2 08/11/2018    MCV 95 08/11/2018     08/11/2018     TSH (uIU/mL)   Date Value   06/21/2018 2.87     Lab Results   Component Value Date    ALT 10 08/11/2018    AST 17 08/11/2018    ALKPHOS 51 08/11/2018    BILITOT 0.5 08/11/2018     BNP (pg/mL)   Date Value   08/10/2018 394 (H)   07/12/2018 352 (H)   04/18/2018 579 (H)           Much or all of the text in this note was generated through the use of the Dragon Dictate voice-to-text software. Errors in spelling or words which seem out of context are unintentional. Sound alike errors, in particular,  may have escaped editing.

## 2021-06-27 NOTE — PROGRESS NOTES
Progress Notes by Jose Antonio Royal MD at 4/4/2019  2:30 PM     Author: Jose Antonio Royal MD Service: -- Author Type: Physician    Filed: 4/4/2019  3:07 PM Encounter Date: 4/4/2019 Status: Signed    : Jose Antonio Royal MD (Physician)           Click to link to [facility] Pulmonary Hypertension Clinic Note    Assessment:    1. Pulmonary hypertension due to lung diseases and hypoxia, WHO group 3     2. Chronic respiratory failure with hypoxia (H)         Plan:    1. Hola did not see the pulmonary consultant as I advised last year.  Please see my prior note for details, but to summarize, we should not proceed with other evaluation for pulmonary hypertension including cardiac catheterization until he has had a pulmonary consultation.  I would be pleased to see him in follow-up once that evaluation is completed and have asked my staff to schedule him with 1 of the 4 pulmonary physicians who has special expertise and interest in patients with pulmonary hypertension.    An After Visit Summary was printed and given to the patient.    Subjective:    Hola Garcia returned for a follow up visit in the Pulmonary Hypertension Clinic.    Unfortunately, he never followed through with a pulmonary consultation I requested.  There has been no change in his chronic shortness of breath.  He does not describe angina pectoris or syncope.  He remains on continuous oxygen supplementation by nasal cannula.    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  I recommended that he bring this up with the pulmonary consultant.    Problem List:    Patient Active Problem List   Diagnosis   ? Essential hypertension   ? Persistent atrial fibrillation (H)   ? Presence of permanent cardiac pacemaker--- dual chamber [place]   ? Chronic respiratory failure with hypoxia (H)   ? Pulmonary hypertension due to lung diseases and hypoxia, WHO group 3   ? Anemia   ? Dyslipidemia, goal LDL  below 70   ? DM2 (diabetes mellitus, type 2) (H)   ? Nonintractable epilepsy without status epilepticus, unspecified epilepsy type (H)       Past Medical History:   Diagnosis Date   ? Anemia 12/25/2014    cause not specified in medical records   ? CAP (community acquired pneumonia) 12/30/2016   ? CVA (cerebral vascular accident) (H) 10/22/2018   ? Disc disorder 04/30/2014    s/p lumbar disk surgery  4/30/2014 recent increase L leg pain but MRI showed nothing to operate on.     ? Dyslipidemia, goal LDL below 70    ? Essential hypertension    ? Essential tremor    ? Persistent atrial fibrillation (H)    ? Pulmonary hypertension due to lung diseases and hypoxia, WHO group 3 12/26/2014   ? PVC's (premature ventricular contractions) causing false impression of bradycardia 7/12/2018    See EKG done in ER at [facility]; pacer function was normal   ? Sick sinus syndrome (H) 10/01/2015    received a pacer   ? 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 Type 2 diabetes mellitus (H)        Past Surgical History:   Procedure Laterality Date   ? A-V CARDIAC PACEMAKER INSERTION  10/01/2015    Dr. Dobson for sick sinus syndrome   ? BACK SURGERY     ? EYE SURGERY     ? TONSILLECTOMY         Past Surgical History Pertinent Negatives:   Procedure Date Noted   ? SPLENECTOMY 06/21/2018        reports that he quit smoking about 44 years ago. His smoking use included pipe. he has never used smokeless tobacco. He reports that he does not drink alcohol or use drugs.    Family History   Problem Relation Age of Onset   ? Glaucoma Mother    ? Other Father         Hola did not have contact with his father as his parents        Outpatient Encounter Medications as of 4/4/2019   Medication Sig Dispense Refill   ? apixaban (ELIQUIS) 5 mg Tab tablet Take 1 tablet (5 mg total) by mouth every 12 (twelve) hours. 60 tablet 11   ? atorvastatin (LIPITOR) 20 MG tablet Take 20 mg by mouth once a week. On Fridays      ? brimonidine-timolol (COMBIGAN) 0.2-0.5 % ophthalmic solution Administer 1 drop to both eyes 2 (two) times a day.     ? calcium-vitamin D (CALCIUM-VITAMIN D) 500 mg(1,250mg) -200 unit per tablet Take 1 tablet by mouth daily with lunch.     ? cholecalciferol, vitamin D3, 2,000 unit cap Take 2,000 Units by mouth daily.     ? furosemide (LASIX) 40 MG tablet Take 40 mg by mouth every other day. Alternate 40 mg and 80 mg every other day.     ? furosemide (LASIX) 40 MG tablet Take 80 mg by mouth every other day. Alternate 40 mg and 80 mg every other day.     ? isosorbide mononitrate (IMDUR) 30 MG 24 hr tablet TAKE ONE-HALF TABLET BY MOUTH TWO TIMES A DAY 90 tablet 1   ? metFORMIN (GLUCOPHAGE) 500 MG tablet Take 500 mg by mouth 3 (three) times a day with meals.     ? nitroglycerin (NITROSTAT) 0.4 MG SL tablet Place 0.4 mg under the tongue every 5 (five) minutes as needed for chest pain.     ? omega-3 fatty acids-vitamin E (FISH OIL) 1,000 mg cap Take 1,000 mg by mouth daily.     ? OXYGEN-AIR DELIVERY SYSTEMS MISC 2 L into each nostril continuous. Indications: COPD     ? ranibizumab (LUCENTIS) 0.5 mg/0.05 mL injection Administer 0.5 mg to the right eye every 2 (two) months.      ? sotalol (BETAPACE) 80 MG tablet TAKE ONE TABLET BY MOUTH TWICE A  tablet 1   ? travoprost (TRAVATAN Z) 0.004 % Drop ophthalmic drops Administer 1 drop to both eyes bedtime.     ? umeclidinium-vilanterol (ANORO ELLIPTA) 62.5-25 mcg/actuation inhaler Inhale 1 puff daily.     ? vitamin A-vitamin C-vit E-min (OCUVITE) Tab tablet Take 1 tablet by mouth 2 (two) times a day.     ? levETIRAcetam (KEPPRA) 500 MG tablet Take 1.5 tablets (750 mg total) by mouth 2 (two) times a day. 30 tablet 1   ? loperamide (IMODIUM) 1 mg/5 mL solution Take 2 mg by mouth 4 (four) times a day as needed. Can take with Atorvastatin to prevent diarrhea.       No facility-administered encounter medications on file as of 4/4/2019.        Review of Systems:    General:  WNL  Eyes: WNL  Ears/Nose/Throat: WNL  Lungs: WNL  Heart: WNL  Stomach: WNL  Bladder: WNL  Muscle/Joints: WNL  Skin: WNL  Nervous System: WNL  Mental Health: WNL     Blood: WNL    Objective:     /72 (Patient Site: Right Arm, Patient Position: Sitting, Cuff Size: Adult Large)   Pulse 62   Resp 18   Ht 6' (1.829 m)   Wt 220 lb (99.8 kg) Comment: Per pt.  BMI 29.84 kg/m    Wt Readings from Last 5 Encounters:   04/04/19 220 lb (99.8 kg)   10/24/18 (!) 225 lb 3.2 oz (102.2 kg)   10/22/18 (!) 239 lb (108.4 kg)   08/30/18 (!) 230 lb (104.3 kg)   08/10/18 (!) 229 lb 8 oz (104.1 kg)         Physical Exam:      GENERAL APPEARANCE: alert, no apparent distress  EYES: no scleral icterus  NECK: no carotid bruits or adenopathy, jugular venous pressure is less than 5 cm  CHEST: symmetric, the lungs are clear to auscultation  CARDIOVASCULAR: regular rhythm with normal first heart sound and augmented second heart sound, no murmur or gallop  EXTREMITIES: no cyanosis, clubbing or edema  SKIN: no xanthelasma  NEUROLOGIC: normal coordination; he is using a rolling walker for ambulation  PSYCHIATRIC: mood and affect are normal      Cardiac Testing:    Echocardiogram:   Results for orders placed during the hospital encounter of 04/25/18   Echo Complete [ECH10] 04/25/2018    Narrative   When compared to the previous study dated 3/8/2017, Pulmonary   hypertension has worsened and there is increased right ventricular   dilatation and dysfunction.    Left ventricle ejection fraction is normal. The calculated left   ventricular ejection fraction is 56%.    Normal left ventricular size and systolic function.    Systolic and diastolic flattening of the interventricular septum   consistent with right ventricle pressure and volume overload.    Right Ventricle: The right ventricle is moderately dilated. The systolic   function is moderately reduced. Pacer lead is present in the ventricle.    Left Atrium: Left atrial volume is moderately  increased.    The aortic valve is sclerotic without reduced excursion.    Severe pulmonary hypertension present.          Pulmonary:    PFT Complete (Order [id number])   PFT   Date: 7/30/2018 Department: [facility] Respiratory Released By: Ruba Epps Authorizing: Perla Ahumada MD   Study Result     FEV1/FVC is 90 and is normal.  FEV1 is 74% predicted and is reduced.  FVC is 59% predicted and reduced.  There was no improvement in spirometry after a single inhaled dose of bronchodilator.  TLC is 53% predicted and is reduced.  RV is 55% predicted and is reduced.  DLCO is 64% predicted and is reduced when it   is corrected for hemoglobin.     Impression:  Full Pulmonary Function Test is abnormal.  PFTs are consistent with moderate restrictive disease.  Diffusion capacity when corrected for hemoglobin is mildly reduced.       Imaging:    No results found.    Lab Results:    Lab Results   Component Value Date     04/01/2019    K 4.6 04/01/2019    CL 98 04/01/2019    CO2 30 04/01/2019    BUN 19 04/01/2019    CREATININE 1.07 04/01/2019    CALCIUM 9.7 04/01/2019     Lab Results   Component Value Date    WBC 8.2 10/22/2018    WBC 10.3 09/29/2015    HGB 11.9 (L) 10/22/2018    HCT 36.1 (L) 10/22/2018    MCV 93 10/22/2018     10/22/2018     TSH (uIU/mL)   Date Value   06/21/2018 2.87     Lab Results   Component Value Date    ALT 9 04/01/2019    AST 15 04/01/2019    ALKPHOS 51 04/01/2019    BILITOT 0.6 04/01/2019     BNP (pg/mL)   Date Value   08/10/2018 394 (H)   07/12/2018 352 (H)   04/18/2018 579 (H)           Much or all of the text in this note was generated through the use of the Dragon Dictate voice-to-text software. Errors in spelling or words which seem out of context are unintentional. Sound alike errors, in particular, may have escaped editing.

## 2021-07-14 PROBLEM — I63.9 CVA (CEREBRAL VASCULAR ACCIDENT) (H): Status: RESOLVED | Noted: 2018-10-22 | Resolved: 2019-01-01

## 2021-07-14 PROBLEM — G45.9 TIA (TRANSIENT ISCHEMIC ATTACK): Status: RESOLVED | Noted: 2018-07-12 | Resolved: 2018-08-30

## 2021-08-03 PROBLEM — I48.91 ATRIAL FIBRILLATION WITH RVR (H): Status: RESOLVED | Noted: 2018-08-10 | Resolved: 2018-08-30

## 2021-08-03 PROBLEM — I48.91 A-FIB (H): Status: RESOLVED | Noted: 2018-08-10 | Resolved: 2018-08-30

## 2021-12-14 NOTE — LETTER
Letter by Joie Alegria MD at      Author: Joie Alegria MD Service: -- Author Type: --    Filed:  Encounter Date: 6/11/2019 Status: (Other)         Giovani Segura MD  [address] [facility] [address]                                  June 11, 2019    Patient: Hola Garcia   MR Number: [id number]   YOB: 1926   Date of Visit: 6/11/2019     Dear Dr. Colton MD:    Thank you for referring Hola Garcia to me for evaluation. Below are the relevant portions of my assessment and plan of care.    If you have questions, please do not hesitate to call me. I look forward to following Hola along with you.    Sincerely,        Joie Alegria MD          CC  MD Airam Casarez Nicole Lynn, MD  6/11/2019  5:43 PM  Sign at close encounter  Pulmonary Clinic Outpatient Consultation    Assessment and Plan:   89 yo M with a history of DM, prior CVA, HLD, persistent atrial fibrillation on sotalol and eliquis, chronic respiratory failure on oxygen, who presents for an evaluation of severe pulmonary hypertension with prior RVSP of 85 mmHg.     History and evaluation thus far notable for mild interstitial lung disease on prior CT from 2014, out of proportion to his O2 needs. This scan also shows mosaic attenuation which can be seen in airways disease, or pulmonary vascular disease. No significant left sided cardiac etiologies on echo, and no intracardiac shunting. The ILD etiology is unknown at this point - he did have asbestos exposure, but this was for a very short period of time.    I discussed with him the following, typical approach of updating a high resolution CT scan as a suspect these changes may have progressed, pursuing V/Q scan to evaluate for chronic thromboembolic disease, sleep study, basic lab work up to work up the ILD/any connective tissue diseases, and possible right heart catheterization pending these results.     He appropriately raised the question of the benefit  [FreeTextEntry1] : Documented by Pancho Rowell acting as a scribe for Kelin Chery on 12/14/2021 . All medical record entries made by the Scribe were at my, Kelin Chery , direction and personally dictated by me on 12/14/2021  . I have reviewed the chart and agree that the record accurately reflects my personal performance of the history, physical exam, assessment and plan. I have also personally directed, reviewed, and agreed with the chart.  of this much testing given his age. He told me that he does not want any further invasive testing at this point in his life, and wants to minimize medications, and only focus on interventions that will result in improvement in his quality of life. He does not want to use CPAP, so I would not pursue a PSG. If we showed worsening ILD on a scan, this would also be unlikely to change his management. Diagnosis of CTEPH via V/Q may mean we change his anticoagulation to coumadin and consider riocoguat, though any PH therapies could worsen his hypoxemia from chronic lung disease.     We discussed all of this in detail. At this time, he does not want any additional testing. He requested any recommendation for treatment of his psot nasal drip and nasal congestion.    PLAN:    Trial of flonase of congestion and PND    Stop anoro, as there is no evidence for a diagnosis of COPD - he will let me know if symptoms worsen off of it    Will hold off on further PH testing after our discussion. If he wanted to do anything, would update a high resolution CT chest and a V/Q scan.       He will let me know if he wants any further PH testing. I will see him back as needed.        Joie Alegria MD  Pulmonary and Critical Care Medicine  [facility] [place]  Office: [phone]  Pager: [phone]    ----------------------    Reason for Consult: Pulmonary hypertension    HPI:   89 yo M with a history of DM, prior CVA, HLD, persistent atrial fibrillation on sotalol and eliquis, chronic respiratory failure on oxygen, who presents for an evaluation of pulmonary hypertension. Last echo had an RVSP of 85 mmHg.     Today he reports that his weights have been stable. He feels his exertional shortness of breath is mild and improved by the 2-3 LPM oxygen. He feels most symptomatic when he is in afib. He is on anoro, which was prescribed when he was given a diagnosis of COPD in the past.      Pulmonary Hypertension Evaluation:  Group I -  PAH:  Familial (BMPR2 gene mutation): No known family history  Connective tissue disease: No known personal, family history. Asymptomatic  Portopulmonary: Liver disease/LFTs: None known, normal LFTs, no alcohol abuse  Weight loss medications: Never  Drugs: cocaine, meth: Never  HIV: reports prior screening negative  TSH: normal in 2018  Travel (schistosomiasis): No recent travel in last 10 years  Medication-induced: interferon, dasatinib: No  Congenital heart disease with L to R shunt (ASD, VSD, PDA) - None on echo    Group II - pulmonary venous HTN  Left heart disease, valvular diseases  Echo results: EF 55-60%, RV moderately dilated with reduced function, RVSP 85 mmHg, negative for intracardiac shunt, trace MR and trace AR    Group III - chronic lung disease  Hypoxemic-vasoconstriction:  CT scan: 2014 - subpleural reticular ILD in bilateral upper-lower lobes. Diffuse mosaicism  PFTs: Moderate restriction with diffusion impairment   Sleep study: No prior PSG, [place] 2  Hypoxemia: On oxygen for 2 years    Group IV - chronic thromboembolic disease (CTEPH)  V/Q scan: None  Personal/FH of VTE: No    Group V - Miscellaneous   PLCH, sarcoid, chronic hemolysis, Gaucher's, heme d/o: No      ROS:  A 12-system review was obtained and was negative with the exception of the symptoms endorsed in the history of present illness.    PMH:  Past Medical History:   Diagnosis Date   ? Anemia 12/25/2014    cause not specified in medical records   ? CAP (community acquired pneumonia) 12/30/2016   ? CVA (cerebral vascular accident) (H) 10/22/2018   ? Disc disorder 04/30/2014    s/p lumbar disk surgery  4/30/2014 recent increase L leg pain but MRI showed nothing to operate on.     ? Dyslipidemia, goal LDL below 70    ? Essential hypertension    ? Essential tremor    ? Persistent atrial fibrillation (H)    ? Pulmonary hypertension due to lung diseases and hypoxia, WHO group 3 12/26/2014   ? PVC's (premature ventricular contractions)  causing false impression of bradycardia 2018    See E[facility] in ER [facility]; pacer function was normal   ? Sick sinus syndrome (H) 10/01/2015    received a pacer   ? 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 Type 2 diabetes mellitus (H)      PSH:  Past Surgical History:   Procedure Laterality Date   ? A-V CARDIAC PACEMAKER INSERTION  10/01/2015    Dr. Dobson for sick sinus syndrome   ? BACK SURGERY     ? EYE SURGERY     ? TONSILLECTOMY       Allergies:  Allergies   Allergen Reactions   ? Codeine Other (See Comments)     Gets sicker on the medication    ? Percocet [Oxycodone-Acetaminophen] Nausea Only     nausea     Family HX:  Family History   Problem Relation Age of Onset   ? Glaucoma Mother    ? Other Father         Hola did not have contact with his father as his parents      Social Hx:  Social History     Socioeconomic History   ? Marital status:      Spouse name: Oliverio Garcia   ? Number of children: 0   ? Years of education: Not on file   ? Highest education level: Not on file   Occupational History   ? 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 Financial resource strain: Not on file   ? Food insecurity:     Worry: Not on file     Inability: Not on file   ? Transportation needs:     Medical: Not on file     Non-medical: Not on file   Tobacco Use   ? Smoking status: Former Smoker     Types: Pipe     Last attempt to quit: 1974     Years since quittin.4   ? Smokeless tobacco: Never Used   Substance and Sexual Activity   ? Alcohol use: No     Alcohol/week: 0.0 oz   ? Drug use: No   ? Sexual activity: Yes     Partners: Female   Lifestyle   ? Physical activity:     Days per week: Not on file     Minutes per session: Not on file   ? Stress: Not on file   Relationships   ? Social connections:     Talks on phone: Not on file     Gets together: Not on file     Attends Holiness  service: Not on file     Active member of club or organization: Not on file     Attends meetings of clubs or organizations: Not on file     Relationship status: Not on file   ? Intimate partner violence:     Fear of current or ex partner: Not on file     Emotionally abused: Not on file     Physically abused: Not on file     Forced sexual activity: Not on file   Other Topics Concern   ? Not on file   Social History Narrative    His wife, Oliverio, has heart failure and COPD. They have two adopted children, Tanisha and Dain. 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    Physical Exam:  /72   Pulse 96   Resp 24   Ht 6' (1.829 m)   Wt 218 lb (98.9 kg) Comment: per pt  SpO2 97% Comment: 3L pulsing  BMI 29.57 kg/m     Gen: Alert, oriented, no distress  HEENT: nasal turbinates are unremarkable, no oropharyngeal lesions, no cervical or supraclavicular lymphadenopathy  CV: Irreg/irreg  Resp: Bibasilar crackles  Abd: soft, nontender, no palpable organomegaly  Skin: no apparent rashes  Ext: no cyanosis, clubbing or edema  Neuro: alert, nonfocal    Labs:  Reviewed    Imaging studies:  Personally reviewed:    12/26/14 CT PE:  ANGIOGRAM CHEST: Negative for pulmonary emboli. Negative for thoracic aortic dissection and aneurysms.     LUNGS AND PLEURA: There is some reticular interstitial disease mainly in the upper lobes.     MEDIASTINUM: Normal.     LIMITED UPPER ABDOMEN:  Negative.     MUSCULOSKELETAL: Negative.     CONCLUSION:  1.  No sign of pulmonary emboli.    7/30/18 PFT's  FEV1/FVC is 90 and is normal.  FEV1 is 74% predicted and is reduced.  FVC is 59% predicted and reduced.  There was no improvement in spirometry after a single inhaled dose of bronchodilator.  TLC is 53% predicted and is reduced.  RV is 55% predicted and is reduced.  DLCO is 64% predicted and is reduced when it   is corrected for hemoglobin.     Impression:  Full Pulmonary Function Test is abnormal.    PFTs are consistent with moderate restrictive disease.  Diffusion capacity when corrected for hemoglobin is mildly reduced.    10/5/18 Echo with Bubble    The visual ejection fraction is estimated at 55-60%.  The right ventricle is moderately dilated.  Moderately decreased right ventricular systolic function  There is no atrial shunt seen.  A contrast injection (Bubble Study) was performed that was negative for flowacross the interatrial septum.  Severe (>55mmHg) pulmonary hypertension is present.  The ascending aorta is Mildly dilated.  The rhythm was sinus bradycardia.  There is no comparison study available.

## 2024-11-13 NOTE — PROCEDURES
Procedure Date: 10/05/2018      ELECTROENCEPHALOGRAM       ORDERING PROVIDER: Dr. Justin Dallas      EEG # , portable      DATE OF RECORDING:  10/05/2018      DURATION OF RECORDIN minutes.      CLINICAL HISTORY:  This patient is a 89-year-old male, who presented with altered mental status.  EEG was requested for evaluation for seizures versus encephalopathy.      TECHNICAL SUMMARY: This EEG monitoring procedure was performed with 23 scalp electrodes in 10-20 electrode system placements, and additional scalp, precordial and other surface electrodes used for electrical referencing and artifact detection.    RESULTS:   BACKGROUND ACTIVITIES: During maximal wakefulness, there is a symmetric, moderate amplitude, well formed, approximately 8 1/2 Hz posterior dominant rhythm, which attenuated with eye opening. Stage I and II sleep were recorded with well formed sleep architectures including vertex sharp transients and sleep spindles. Hyperventilation and photic stimulation were not performed.  INTERICTAL ACTIVITIES: There are no focal pathological slowing or epileptiform abnormalities.   ICTAL ACTIVITIES: There are no clinical or electrographic seizures during this recording.  IMPRESSION:  This is a normal waking and sleep EEG.            KATE BRAVO MD             D: 10/05/2018   T: 10/06/2018   MT: [facility]      Name:     CAIT ZAMORA   MRN:      [id number]        Account:        [id number]   :      1926           Procedure Date: 10/05/2018      Document: [id number]       [General Appearance - Alert] : alert [General Appearance - In No Acute Distress] : in no acute distress [Healing Well] : healing well [No Drainage] : without drainage [Oriented To Time, Place, And Person] : oriented to person, place, and time [Impaired Insight] : insight and judgment were intact [Affect] : the affect was normal [Person] : oriented to person [Place] : oriented to place [Time] : oriented to time [Short Term Intact] : short term memory intact [Remote Intact] : remote memory intact [Span Intact] : the attention span was normal [Concentration Intact] : normal concentrating ability [Fluency] : fluency intact [Comprehension] : comprehension intact [Current Events] : adequate knowledge of current events [Past History] : adequate knowledge of personal past history [Vocabulary] : adequate range of vocabulary [Cranial Nerves Optic (II)] : visual acuity intact bilaterally,  pupils equal round and reactive to light [Cranial Nerves Oculomotor (III)] : extraocular motion intact [Cranial Nerves Trigeminal (V)] : facial sensation intact symmetrically [Cranial Nerves Facial (VII)] : face symmetrical [Cranial Nerves Vestibulocochlear (VIII)] : hearing was intact bilaterally [Cranial Nerves Glossopharyngeal (IX)] : tongue and palate midline [Cranial Nerves Accessory (XI - Cranial And Spinal)] : head turning and shoulder shrug symmetric [Cranial Nerves Hypoglossal (XII)] : there was no tongue deviation with protrusion [Motor Tone] : muscle tone was normal in all four extremities [Motor Strength] : muscle strength was normal in all four extremities [Sensation Tactile Decrease] : light touch was intact [Sclera] : the sclera and conjunctiva were normal [PERRL With Normal Accommodation] : pupils were equal in size, round, reactive to light, with normal accommodation [Extraocular Movements] : extraocular movements were intact [FreeTextEntry6] : Needs to wash hair better; scabs still present also [Over the Past 2 Weeks, Have You Felt Down, Depressed, or Hopeless?] : 1.) Over the past 2 weeks, have you felt down, depressed, or hopeless? No [Over the Past 2 Weeks, Have You Felt Little Interest or Pleasure Doing Things?] : 2.) Over the past 2 weeks, have you felt little interest or pleasure doing things? No [FreeTextEntry1] : depression screening/MDD completed; PH9 score  < 5, negative MDD